# Patient Record
Sex: MALE | Race: WHITE | NOT HISPANIC OR LATINO | Employment: OTHER | URBAN - METROPOLITAN AREA
[De-identification: names, ages, dates, MRNs, and addresses within clinical notes are randomized per-mention and may not be internally consistent; named-entity substitution may affect disease eponyms.]

---

## 2021-01-13 NOTE — PROGRESS NOTES
Assessment & Plan     Left foot pain  55-year-old male, left plantar foot pain, mid ball of the foot, likely foreign body.  X-ray did not show metallic body.  He will return later today for procedure to see if we can locate.  - XR Foot Left G/E 3 Views    Chronic maxillary sinusitis  Recurrent maxillary sinusitis.  He had teeth removed in the past, he has drainage from those areas when he has sinus symptoms.  Obtaining CT scan, to further evaluate, given Augmentin if recurrence of symptoms.  - CT Sinus w/o Contrast; Future  - amoxicillin-clavulanate (AUGMENTIN) 875-125 MG tablet; Take 1 tablet by mouth 2 times daily      25 minutes spent on the date of the encounter doing chart review, interpretation of tests, patient visit and documentation          Tobacco Cessation:   reports that he has been smoking cigarettes. He has a 30.00 pack-year smoking history. He has never used smokeless tobacco.            Return in about 4 weeks (around 2/12/2021) for Annual Well Check.    Ron Terrazas MD  [facility] XOCHITL Hi is a 55 year old who presents to clinic today for the following health issues     HPI       Concern - Foot Pain  Onset: Puncture wound in April 2020. Not sure what he stepped on  Description: it is causing so much pain he cant walk on it at all. He has to have shoes on. But by the end of the day he can barely walk on it. Has its own heart beat.  Intensity: moderate  Progression of Symptoms:  Worsening- been worsening since Sept.  Accompanying Signs & Symptoms: no drainage. Redness but no puss coming out.   Previous history of similar problem: no  Precipitating factors:        Worsened by: walking  Alleviating factors:        Improved by: no  Therapies tried and outcome: he has tried to cut things out of it.       Also states that he has an off and on sinus infection for a while. No fever. But he has mouth surgery about 4 years ago. They removed on of his upper left molars.  States that the roots went up into the sinus, and since then he things food and things get up there and cause infections.             Review of Systems   Constitutional, HEENT, cardiovascular, pulmonary, gi and gu systems are negative, except as otherwise noted.      Objective    /72   Pulse 97   Temp 98  F (36.7  C) (Temporal)   Resp 14   Wt 87.1 kg (192 lb)   SpO2 99%   BMI 27.74 kg/m    Body mass index is 27.74 kg/m .  Physical Exam   GENERAL: healthy, alert and no distress  EYES: Eyes grossly normal to inspection, PERRL and conjunctivae and sclerae normal  HENT: ear canals and TM's normal, nose and mouth without ulcers or lesions  NECK: no adenopathy, no asymmetry, masses, or scars and thyroid normal to palpation  RESP: lungs clear to auscultation - no rales, rhonchi or wheezes  CV: regular rate and rhythm, normal S1 S2, no S3 or S4, no murmur, click or rub, no peripheral edema and peripheral pulses strong  MS: normal muscle tone, normal range of motion, no cyanosis, clubbing, or edema and callus formation left ball of foot, tender to palpation  NEURO: Normal strength and tone, mentation intact and speech normal  PSYCH: mentation appears normal, affect normal/bright    Xray - Reviewed and interpreted by me.  No obvious foreign body

## 2021-01-15 ENCOUNTER — ANCILLARY PROCEDURE (OUTPATIENT)
Dept: GENERAL RADIOLOGY | Facility: CLINIC | Age: 56
End: 2021-01-15
Attending: FAMILY MEDICINE
Payer: COMMERCIAL

## 2021-01-15 ENCOUNTER — OFFICE VISIT (OUTPATIENT)
Dept: FAMILY MEDICINE | Facility: CLINIC | Age: 56
End: 2021-01-15
Payer: COMMERCIAL

## 2021-01-15 VITALS
WEIGHT: 192 LBS | RESPIRATION RATE: 14 BRPM | OXYGEN SATURATION: 99 % | HEART RATE: 97 BPM | BODY MASS INDEX: 27.74 KG/M2 | TEMPERATURE: 98 F | SYSTOLIC BLOOD PRESSURE: 126 MMHG | DIASTOLIC BLOOD PRESSURE: 72 MMHG

## 2021-01-15 DIAGNOSIS — Z87.821 HISTORY OF RETAINED FOREIGN BODY FULLY REMOVED: Primary | ICD-10-CM

## 2021-01-15 DIAGNOSIS — J32.0 CHRONIC MAXILLARY SINUSITIS: ICD-10-CM

## 2021-01-15 DIAGNOSIS — M79.672 LEFT FOOT PAIN: Primary | ICD-10-CM

## 2021-01-15 PROCEDURE — 99203 OFFICE O/P NEW LOW 30 MIN: CPT | Mod: 25 | Performed by: FAMILY MEDICINE

## 2021-01-15 PROCEDURE — 28190 REMOVAL OF FOOT FOREIGN BODY: CPT | Performed by: FAMILY MEDICINE

## 2021-01-15 PROCEDURE — 99207 PR DROP WITH A PROCEDURE: CPT | Performed by: FAMILY MEDICINE

## 2021-01-15 PROCEDURE — 73630 X-RAY EXAM OF FOOT: CPT | Mod: LT | Performed by: RADIOLOGY

## 2021-01-15 NOTE — PROGRESS NOTES
Assessment & Plan     History of retained foreign body fully removed  Follow-up from last visit, patient had left plantar ball of foot, would like small foreign body removed.  See procedure note below.  Tolerated procedure well was discharged in stable condition with wound care follow-up instructions.  Follow-up if no improvement or any worsening.  - REMOVAL FOREIGN BODY FOOT, SUBCUTANEOUS         Tobacco Cessation:   reports that he has been smoking cigarettes. He has a 30.00 pack-year smoking history. He has never used smokeless tobacco.        Return in about 4 weeks (around 2/12/2021) for Annual Well Check.    Ron Terrazas MD  [facility] XOCHITL Hi is a 55 year old who presents to clinic today for the following health issues     HPI    patient is here for a removal of a forigen body from the bottom of his left foot.        Review of Systems   Constitutional, HEENT, cardiovascular, pulmonary, gi and gu systems are negative, except as otherwise noted.      Objective    There were no vitals taken for this visit.  There is no height or weight on file to calculate BMI.  Physical Exam   GENERAL: healthy, alert and no distress  MS: no gross musculoskeletal defects noted, no edema  SKIN: Left plantar ball of foot, small 8 mm area of callus, dark brown material in center    Procedure note-foreign body removal  Explained procedure to patient, written consent was obtained.  Area was cleansed with alcohol and injected with 0.2 cc of lidocaine 1% without epinephrine.  Alligator clip was used to grasp the end of the foreign material and remove, appeared plant-like.  An 18-gauge needle was used to explore the remaining area, dressed with bacitracin and a Band-Aid.  Discharged in stable condition with wound care and follow-up instructions.

## 2021-01-25 ENCOUNTER — TELEPHONE (OUTPATIENT)
Dept: FAMILY MEDICINE | Facility: CLINIC | Age: 56
End: 2021-01-25

## 2021-01-25 ENCOUNTER — ANCILLARY PROCEDURE (OUTPATIENT)
Dept: CT IMAGING | Facility: CLINIC | Age: 56
End: 2021-01-25
Attending: FAMILY MEDICINE
Payer: COMMERCIAL

## 2021-01-25 DIAGNOSIS — J32.0 CHRONIC MAXILLARY SINUSITIS: ICD-10-CM

## 2021-01-25 PROCEDURE — 70486 CT MAXILLOFACIAL W/O DYE: CPT | Performed by: RADIOLOGY

## 2021-01-25 NOTE — RESULT ENCOUNTER NOTE
Please advise patient that his CT scan did not show chronic sinusitis.  Follow-up if continued symptoms, at that time would recommend ENT referral.    Ron Terrazas MD, FAAFP  Family Medicine Physician  [facility] [place]  [address], [place] [address]

## 2021-01-26 NOTE — TELEPHONE ENCOUNTER
----- Message from Ron Terrazas MD sent at 1/25/2021  3:59 PM CST -----  Please advise patient that his CT scan did not show chronic sinusitis.  Follow-up if continued symptoms, at that time would recommend ENT referral.    Ron Terrazas MD, FAAFP  Family Medicine Physician  [facility] [place]  [address] [place] [address]

## 2021-04-24 ENCOUNTER — HEALTH MAINTENANCE LETTER (OUTPATIENT)
Age: 56
End: 2021-04-24

## 2021-05-25 ENCOUNTER — TELEPHONE (OUTPATIENT)
Dept: FAMILY MEDICINE | Facility: CLINIC | Age: 56
End: 2021-05-25

## 2021-05-25 ENCOUNTER — MYC MEDICAL ADVICE (OUTPATIENT)
Dept: FAMILY MEDICINE | Facility: CLINIC | Age: 56
End: 2021-05-25

## 2021-05-25 NOTE — TELEPHONE ENCOUNTER
Pt is wondering if there are any other suggestions for his sciatica pain, chiropractor is not helping.

## 2021-05-27 ENCOUNTER — ANCILLARY PROCEDURE (OUTPATIENT)
Dept: GENERAL RADIOLOGY | Facility: CLINIC | Age: 56
End: 2021-05-27
Attending: FAMILY MEDICINE
Payer: COMMERCIAL

## 2021-05-27 ENCOUNTER — OFFICE VISIT (OUTPATIENT)
Dept: FAMILY MEDICINE | Facility: CLINIC | Age: 56
End: 2021-05-27
Payer: COMMERCIAL

## 2021-05-27 VITALS
RESPIRATION RATE: 14 BRPM | OXYGEN SATURATION: 99 % | TEMPERATURE: 98.6 F | BODY MASS INDEX: 26.77 KG/M2 | HEART RATE: 109 BPM | SYSTOLIC BLOOD PRESSURE: 132 MMHG | WEIGHT: 187 LBS | DIASTOLIC BLOOD PRESSURE: 74 MMHG | HEIGHT: 70 IN

## 2021-05-27 DIAGNOSIS — M54.41 ACUTE RIGHT-SIDED LOW BACK PAIN WITH RIGHT-SIDED SCIATICA: Primary | ICD-10-CM

## 2021-05-27 PROCEDURE — 99214 OFFICE O/P EST MOD 30 MIN: CPT | Performed by: FAMILY MEDICINE

## 2021-05-27 PROCEDURE — 72100 X-RAY EXAM L-S SPINE 2/3 VWS: CPT | Performed by: RADIOLOGY

## 2021-05-27 RX ORDER — GABAPENTIN 100 MG/1
100 CAPSULE ORAL 2 TIMES DAILY
Qty: 60 CAPSULE | Refills: 0 | Status: ON HOLD | OUTPATIENT
Start: 2021-05-27 | End: 2021-08-11

## 2021-05-27 RX ORDER — CYCLOBENZAPRINE HCL 10 MG
10 TABLET ORAL 3 TIMES DAILY PRN
Qty: 21 TABLET | Refills: 0 | Status: SHIPPED | OUTPATIENT
Start: 2021-05-27 | End: 2021-08-12

## 2021-05-27 ASSESSMENT — MIFFLIN-ST. JEOR: SCORE: 1675.73

## 2021-05-27 NOTE — PATIENT INSTRUCTIONS
Patient Education     Back Care Tips     Caring for your back  These are things you can do to prevent a recurrence of acute back pain and to reduce symptoms from chronic back pain:    Stay at a healthy weight. If you are overweight, losing weight will help most types of back pain.    Exercise is an important part of recovery from most types of back pain. The muscles behind and in front of the spine support the back. This means strengthening both the back muscles and the abdominal muscles will provide better support for your spine.     Swimming and brisk walking are good overall exercises to improve your fitness level.    Practice safe lifting methods (see below).    Practice good posture when sitting, standing, and walking. Don't sit for a long time. This puts more stress on the lower back than standing or walking.    Wear quality shoes with good arch support. Foot and ankle alignment can affect back symptoms. Don't wear high heels.    Therapeutic massage can help relax the back muscles without stretching them.    During the first 24 to 72 hours after an acute injury or flare-up of chronic back pain, put an ice pack on the painful area for 20 minutes and then remove it for 20 minutes. Do thisover a period of 60 to 90 minutes, or several times a day. As a safety precaution, don't use a heating pad at bedtime. Sleeping on a heating pad can lead to skin burns or tissue damage.    You can alternate using ice and heat.  Medicines  Talk with your healthcare provider before using medicines, especially if you have other health problems or are taking other medicines.    You may use over-the-counter medicines, such as acetaminophen, ibuprofen, or naprosyn to control pain, unless your healthcare provider prescribed other pain medicine. Talk with your healthcare provider before taking any medicines if you have a chronic condition such as diabetes, liver or kidney disease, stomach ulcers, or digestive bleeding, or are taking  blood thinners.    Be careful if you are given prescription pain medicines, opioids, or medicine for muscle spasm. They can cause drowsiness, and affect your coordination, reflexes, and judgment. Don't drive or operate heavy machinery while taking these types of medicines. Take prescription pain medicine only as prescribed by your healthcare provider.  Lumbar stretch  This simple stretch will help relax muscle spasm and keep your back more limber. If exercise makes your back pain worse, don t do it.    Lie on your back with your knees bent and both feet on the ground.    Slowly raise your left knee to your chest as you flatten your lower back against the floor. Hold for 5 seconds.    Relax and repeat the exercise with your right knee.    Do 10 of these exercises for each leg.  Safe lifting method    Don t bend over at the waist to lift an object off the floor.  Instead, bend your knees and hips in a squat.     Keep your back and head upright    Hold the object close to your body, directly in front of you.    Straighten your legs to lift the object.     Lower the object to the floor in the reverse fashion.    If you must slide something across the floor, push it.    Posture tips  Sitting  Sit in chairs with straight backs or low-back support. Keep your knees lower than your hips, with your feet flat on the floor.  When driving, sit up straight. Adjust the seat forward so you are not leaning toward the steering wheel.  A small pillow or rolled towel behind your lower back may help if you are driving long distances.   Standing  When standing for long periods, shift most of your weight to one leg at a time. Switch legs every few minutes.   Sleeping  The best way to sleep is on your side with your knees bent. Put a low pillow under your head to support your neck in a neutral spine position. Don't use thick pillows that bend your neck to one side. Put a pillow between your legs to further relax your lower back. If you  sleep on your back, put pillows under your knees to support your legs in a slightly flexed position. Use a firm mattress. If your mattress sags, replace it, or use a 1/2-inch plywood board under the mattress to add support.  Follow-up care  Follow up with your healthcare provider, or as advised.  If X-rays, a CT scan or an MRI scan were taken, they may be reviewed by a radiologist. You will be told of any new findings that may affect your care.  Call 911  Call 911 if any of the following occur:    Trouble breathing    Confusion    Very drowsy    Fainting or loss of consciousness    Rapid or very slow heart rate    Loss of  bowel or bladder control  When to seek medical advice  Call your healthcare provider right away if any of the following occur:    Pain becomes worse or spreads to your arms or legs    Weakness or numbness in one or both arms or legs    Numbness in the groin area  [employer] last reviewed this educational content on 11/1/2019 2000-2021 [employer]. All rights reserved. This information is not intended as a substitute for professional medical care. Always follow your healthcare professional's instructions.           Patient Education     Relieving Back Pain  Back pain is a common problem. You can strain back muscles by lifting too much weight or just by moving the wrong way. Back strain can be uncomfortable, even painful. And it can take weeks or months to improve. To help yourself feel better and prevent future back strains, try these tips.  Important: Don't give aspirin to children or teens without first discussing it with your child's healthcare provider.  Ice    Ice reduces muscle pain and swelling. It helps most during the first 24 to 48 hours after an injury.    Wrap an ice pack or a bag of frozen peas in a thin towel. Never put ice directly on your skin.    Place the ice where your back hurts the most.    Don t ice for more than 20 minutes at a time.    You can use ice several  times a day.  Medicines  Over-the-counter pain relievers include acetaminophen and anti-inflammatory medicines, which includes aspirin, naproxen, or ibuprofen. They can help ease discomfort. Some also reduce swelling.    Tell your healthcare provider about any medicines you are already taking.    Take medicines only as directed.  Manipulation and massage  Having manipulation by an osteopathic doctor or chiropractor may be helpful. Getting a massage also may help.   Heat  After the first 48 hours, heat can relax sore muscles and improve blood flow.    Try a warm bath or shower. Or use a heating pad set on low. To prevent a burn, keep a cloth between you and the heating pad.    Don t use a heating pad for more than 15 minutes at a time. Never sleep on a heating pad.  [employer] last reviewed this educational content on 6/1/2018 2000-2021 [employer]. All rights reserved. This information is not intended as a substitute for professional medical care. Always follow your healthcare professional's instructions.

## 2021-05-27 NOTE — PROGRESS NOTES
"    Assessment & Plan     Acute right-sided low back pain with right-sided sciatica  55-year-old male with a acute right-sided low back pain with right-sided sciatic symptoms.  He has had some improvement with chiropractor and massage.  After recent yardwork has had some worsening.  On exam today he does have mild positive straight leg raise, x-ray shows degenerative changes L4 L5-S1, he declined physical therapy.  We discussed MRI and neurosurgery evaluation, he would like to try cyclobenzaprine and gabapentin first.  If no improvement he will follow-up and we will consider MRI neurosurgery evaluation for corticosteroid injection.  - XR Lumbar Spine 2/3 Views  - cyclobenzaprine (FLEXERIL) 10 MG tablet; Take 1 tablet (10 mg) by mouth 3 times daily as needed for muscle spasms  - gabapentin (NEURONTIN) 100 MG capsule; Take 1 capsule (100 mg) by mouth 2 times daily       Tobacco Cessation:   reports that he has been smoking cigarettes. He has a 30.00 pack-year smoking history. He has never used smokeless tobacco.  Tobacco Cessation Action Plan: Information offered: Patient not interested at this time    BMI:   Estimated body mass index is 27.01 kg/m  as calculated from the following:    Height as of this encounter: 1.772 m (5' 9.76\").    Weight as of this encounter: 84.8 kg (187 lb).         Return in about 4 weeks (around 6/24/2021) for Annual Well Check.    Ron Terrazas MD  [facility] X[place] Hi is a 55 year old who presents for the following health issues     HPI     Your back pain is chronic   Fv Hpi Chronic Recurring Back Pain    Question 5/27/2021  8:15 AM CDT - Filed by Patient   Where is your back pain located?  right buttock   How would you describe your back pain?  burning    cramping    gnawing    sharp    shooting    stabbing   Where does your back pain spread?  right thigh    right knee    right foot   Since you noticed your back pain, how has it changed?  always present, " "but gets better and worse   Does your back pain interfere with your job? Yes   Since your last visit, have you tried any new treatment? Yes   If yes, which: Chiropractor    cold    heat    massage    NSAIDS (Ibuprofen, Naproxen)    TENS unit       Patient states that he has been seeing the chiropractor for years for other things. But its been consistent for the last 3 weeks. Also has had deep muscle therapy 2 times this week. This helped relax things, but not at all.     States that he feels like he has any quality of life right now. Making it hard to work or do anything that he really wants to do.     Taking the Aleve, but taking more than he should be.        Review of Systems   Constitutional, HEENT, cardiovascular, pulmonary, gi and gu systems are negative, except as otherwise noted.      Objective    /74   Pulse 109   Temp 98.6  F (37  C) (Temporal)   Resp 14   Ht 1.772 m (5' 9.76\")   Wt 84.8 kg (187 lb)   SpO2 99%   BMI 27.01 kg/m    Body mass index is 27.01 kg/m .  Physical Exam   GENERAL: healthy, alert and no distress  RESP: lungs clear to auscultation - no rales, rhonchi or wheezes  CV: regular rate and rhythm, normal S1 S2, no S3 or S4, no murmur, click or rub, no peripheral edema and peripheral pulses strong  MS: no gross musculoskeletal defects noted, no edema  NEURO: Normal strength and tone, mentation intact and speech normal  Comprehensive back pain exam:  No tenderness, Range of motion not limited by pain, Lower extremity strength functional and equal on both sides, Lower extremity reflexes within normal limits bilaterally, Lower extremity sensation normal and equal on both sides and Straight leg positive on  right, indicating possible ipsilateral radiculopathy  PSYCH: mentation appears normal, affect normal/bright    Xray - Reviewed and interpreted by me.  Lumbar, degenerative changes L4, L5-S1            "

## 2021-06-16 ENCOUNTER — TELEPHONE (OUTPATIENT)
Dept: FAMILY MEDICINE | Facility: CLINIC | Age: 56
End: 2021-06-16

## 2021-06-16 DIAGNOSIS — M54.41 ACUTE RIGHT-SIDED LOW BACK PAIN WITH RIGHT-SIDED SCIATICA: Primary | ICD-10-CM

## 2021-06-16 NOTE — TELEPHONE ENCOUNTER
Reason for Call: Request for an order or referral:    Order or referral being requested: MRI and Neurosurgery consult    Date needed: by next week    Has the patient been seen by the PCP for this problem? YES    Additional comments: 5/27/21 note states -  If no improvement he will follow-up and we will consider MRI neurosurgery evaluation for corticosteroid injection. Patient states no improvement.    Phone number Patient can be reached at:  Cell number on file:    Telephone Information:   Mobile [phone]       Best Time:  ASAP    Can we leave a detailed message on this number?      Call taken on 6/16/2021 at 8:05 AM by Samreen Mays

## 2021-06-16 NOTE — TELEPHONE ENCOUNTER
Order placed for MRI and referral placed as well.     Please assist with scheduling MRI as needed.     Patient will receive a call to schedule.

## 2021-06-16 NOTE — TELEPHONE ENCOUNTER
Provider please review and advise-do you want to order or do follow up visit (virtual or inperson?) and then order?    Romelia Larson CMA ([facility])

## 2021-06-21 ENCOUNTER — NURSE TRIAGE (OUTPATIENT)
Dept: FAMILY MEDICINE | Facility: CLINIC | Age: 56
End: 2021-06-21

## 2021-06-21 DIAGNOSIS — M54.41 ACUTE RIGHT-SIDED LOW BACK PAIN WITH RIGHT-SIDED SCIATICA: Primary | ICD-10-CM

## 2021-06-21 RX ORDER — GABAPENTIN 100 MG/1
200 CAPSULE ORAL 3 TIMES DAILY
Qty: 90 CAPSULE | Refills: 1 | Status: ON HOLD | OUTPATIENT
Start: 2021-06-21 | End: 2021-08-11

## 2021-06-21 NOTE — TELEPHONE ENCOUNTER
Patient was informed. Team please reach out to the patient to schedule an orthopedic appointment.     Juan Gold RN, BSN  [facility] [facility] [facility]  June 21, 2021

## 2021-06-21 NOTE — TELEPHONE ENCOUNTER
"Pain started couple months ago. As of last week pain became unbearable. Was seen 5/27/21 with SA who recommended MRI and steroid injections if no improvement. Patient has appt for MRI on Friday 6/25 but wants to skip the corticosteroid injections because he states he has talked with a few people who have these done and they are doing one every month for few months in a row and patient states \"I cannot wait that long, I need something done now and need this expedited\" Patient states he has no quality of life and can't go on like this. States pain is unbearable. Pain when standing is \"18/10\" and when lying with rotating heat and ice can get it down to 3/10. Is unable to walk \"AT ALL\" and unable to to anything. States the medications that were prescribed (Flexeril, Gabapentin) do not do anything and don't help at all. States he has done chiropractor and muscle therapy and these are not helping.     Patient would like some expedited help to get him some relief because once again he has no \"quality of life\" and cannot go on like this. As mentioned above MRI is on 6/25.     Routing to provider for review-    1. ONSET: \"When did the pain start?\"       See above  2. LOCATION: \"Where is the pain located?\"       Right leg. Entire leg  3. PAIN: \"How bad is the pain?\"    (Scale 1-10; or mild, moderate, severe)    -  MILD (1-3): doesn't interfere with normal activities     -  MODERATE (4-7): interferes with normal activities (e.g., work or school) or awakens from sleep, limping     -  SEVERE (8-10): excruciating pain, unable to do any normal activities, unable to walk      Standing \"I would rate it as 18\" Laying down and not moving while icing can get down to 3/10. Pills he received did nothing  4. WORK OR EXERCISE: \"Has there been any recent work or exercise that involved this part of the body?\"       No  5. CAUSE: \"What do you think is causing the leg pain?\"      Life long issue  6. OTHER SYMPTOMS: \"Do you have any other " "symptoms?\" (e.g., chest pain, back pain, breathing difficulty, swelling, rash, fever, numbness, weakness)      Mild back pain. Thinks the pain is radiating from back and a nerve is affected  7. PREGNANCY: \"Is there any chance you are pregnant?\" \"When was your last menstrual period?\"      NA    GO TO ED NOW: You need to be seen in the Emergency Department. Go to the ER Hospital. Leave now. Drive carefully.    States he was seen for this and wants to see what his PCP recommends.    Leida Rob RN  [place]        Reason for Disposition    Unable to walk    Additional Information    Negative: Looks like a broken bone or dislocated joint (e.g., crooked or deformed)    Negative: Sounds like a life-threatening emergency to the triager    Negative: Followed a hip injury    Negative: Followed a knee injury    Negative: Followed an ankle or foot injury    Negative: Back pain radiating (shooting) into leg(s)    Negative: Foot pain is the main symptom    Negative: Ankle pain is the main symptom    Negative: Knee pain is the main symptom    Negative: Leg swelling is the main symptom    Negative: Chest pain    Negative: Difficulty breathing    Negative: Entire foot is cool or blue in comparison to other side    Protocols used: LEG PAIN-A-OH      "

## 2021-06-21 NOTE — TELEPHONE ENCOUNTER
Please advise patient to increase gabapentin to 2 capsules 3 times a day, help him schedule orthopedic  appointment, see previous note.  They had reached out to him to schedule that appointment.  Go to the emergency room if any worsening to include increasing pain and weakness, schedule follow-up appointment with me this week if no improvement.    Ron Terrazas MD, FAAFP  Family Medicine Physician  [facility] [place]  [address], [place] [address]

## 2021-06-25 ENCOUNTER — MYC MEDICAL ADVICE (OUTPATIENT)
Dept: FAMILY MEDICINE | Facility: CLINIC | Age: 56
End: 2021-06-25

## 2021-06-25 ENCOUNTER — ANCILLARY PROCEDURE (OUTPATIENT)
Dept: MRI IMAGING | Facility: CLINIC | Age: 56
End: 2021-06-25
Attending: FAMILY MEDICINE
Payer: COMMERCIAL

## 2021-06-25 DIAGNOSIS — M54.41 ACUTE RIGHT-SIDED LOW BACK PAIN WITH RIGHT-SIDED SCIATICA: ICD-10-CM

## 2021-06-25 PROCEDURE — 72148 MRI LUMBAR SPINE W/O DYE: CPT | Mod: GC | Performed by: RADIOLOGY

## 2021-06-25 NOTE — TELEPHONE ENCOUNTER
Orthopedic referral previously placed.  Please assist patient with scheduling this.    Ron Terrazas MD, FAAFP  Family Medicine Physician  [facility] [place]  [address] [place] [address]

## 2021-06-25 NOTE — RESULT ENCOUNTER NOTE
K[place],  Your recent MRI does show possible nerve impingement that could be causing your symptoms.  Please continue with plan to see orthopedic surgery.  Please let me know if you have any questions or concerns and follow up as discussed in clinic.    Sincerely.  Dr. JEFF Terrazas MD, FAAFP  Family Medicine Physician  [facility] [place]  [address] [place] [address]

## 2021-06-25 NOTE — TELEPHONE ENCOUNTER
Patient calling to check status of message just to make sure it went through ok, he is wanting a response back asap as he cant take the pain anymore and he is missing work due to this.  Please call with results and care plan.

## 2021-07-02 ENCOUNTER — OFFICE VISIT (OUTPATIENT)
Dept: NEUROSURGERY | Facility: CLINIC | Age: 56
End: 2021-07-02
Attending: FAMILY MEDICINE
Payer: COMMERCIAL

## 2021-07-02 VITALS — HEART RATE: 102 BPM | RESPIRATION RATE: 20 BRPM | SYSTOLIC BLOOD PRESSURE: 153 MMHG | DIASTOLIC BLOOD PRESSURE: 80 MMHG

## 2021-07-02 DIAGNOSIS — M54.41 ACUTE RIGHT-SIDED LOW BACK PAIN WITH RIGHT-SIDED SCIATICA: ICD-10-CM

## 2021-07-02 PROCEDURE — 99204 OFFICE O/P NEW MOD 45 MIN: CPT | Performed by: PHYSICIAN ASSISTANT

## 2021-07-02 NOTE — PATIENT INSTRUCTIONS
Patient Instructions    Surgery scheduled at [facility] for Right lumbar 5- sacral 1 hemilaminotomy and microdiscectomy  *lumbarized sacrum* with Dr. Landeros  Pre-Operative    Surgical risks: blood clots in the leg or lung, problems urinating, nerve damage, drainage from the incision, infection,stiffness    Pre-operative physical with primary care physician within 30 days of surgical date.     Likely same day procedure with discharge home day of surgery, may stay for 23 hour observation hospitalization for monitoring.       Shower procedure  o Please shower with antimicrobial soap the night before surgery and morning of surgery. Please refer to showering instruction sheet in folder.    Eating/Drinking  o Stop all solid foods 8 hours before surgery.  o Keep drinking clear liquids until 4 hours before surgery  - Clear liquids include water, clear juice, black coffee, or clear tea without milk, Gatorade, clear soda.     Medications  o Discontinue Aspirin, NSAIDs (Advil/Ibuprofen, Indocin, Naproxen,Nuprin,Relafen/Nabumetone, Diclofenac,Meloxicam, Aleve, Celebrex) x 7 days prior to surgical date  o You can take Tylenol (Acetaminophen) for pain, 1000 mg  - Do not exceed 3,000 mg per day   o Any other medications prescribed, please discuss with your primary care provider at your pre-operative physical     As part of preparation for your upcoming procedure you are required to have a test for the novel Coronavirus, COVID-19  o Please call the drive-up testing number to schedule your appointment. The test needs to be completed within 4 days (96) hours of surgery.   o Scheduling Number: [phone]     Pain Management    Dealing with pain  o As your body heals, you might feel a stabbing, burning, or aching pain. You may also have some numbness.  o Everyone feels pain differently, we may ask you to rate your pain using a pain scale. This will let us know how much pain you feel.   o Keep in mind that  medicine won't take away all of your pain. It helps to try other ways to relax and ease pain.     Things to help with pain  o After surgery, we will give you medicine for your pain. These medications work well, but they can make you drowsy, itchy, or sick to your stomach. If we give you narcotics for pain, try to take the pills with food.   o For mild to moderate pain, you can take medication such as Tylenol. These can be used with narcotics, but make sure that your narcotic does not contain Tylenol.   - Do NOT drive while taking narcotic pain medication  - Do NOT drink alcohol while using any pain medication  o You can utilize ice as needed (no longer than 20 minutes at one time)    You may resume taking NSAIDs (ex. Ibuprofen, aleve, naproxen) 14 days post op     Incision Care    No submerging incision in water such as pools, hot tubs, baths for at least 8 weeks or until incision is healed    It is okay to shower, just pat the incision dry     Remove dressing as instructed upon discharge    Watch for signs of infection  o Redness, swelling, warmth, drainage, and fever of 101 degrees or higher  o Notify clinic [phone].  Activity Restrictions    For the first 6-8 weeks, no lifting > 10 pounds, limited bending, twisting, or overhead reaching.    Take stairs in moderation     Ok to walk as tolerated, take short frequent walks. You may gradually increase the distance as tolerated.     Avoid bed rest and prolonged sitting for longer than 30 minutes (change positions frequently while awake)    No contact sports until after follow up visit    No high impact activities such as; running/jogging, snowmobile or 4 watts riding or any other recreational vehicles.  Post-Op Follow Up Appointments    2 week incision check with RN    6 week post op follow up visit with Physician Assistant or Nurse Practitioner     Please call to schedule follow up appointment at [phone]  Resources    If you are currently employed, you  will need to be off work for 2-4 weeks for post op recovery and healing.  Please fax any FMLA/short term disability paperwork to [phone]. You may call our clinic when you'd like to return to work and we can provide a work letter.     Video: [email or web address]/[email or web address]     (video for lumbar stenosis/decompression )      1.) Someone will be in touch to schedule your steroid injection. Please call our office [phone] if you do not hear from someone by mid-next week.

## 2021-07-02 NOTE — NURSING NOTE
Reviewed pre- and post-operative instructions as outlined in the After Visit Summary/Patient Instructions with patient.   Surgery folder was given to patient    Patient Education Topic: Pre-operative teaching     Learner(s): Patient and Significant other/Spouse     Knowledge Level: Advanced     Readiness to Learn: Ready    Method:  Verbal explanation and Written material     Outcome: Able to verbalize instructions    Barriers to Learning: No barrier    Covid Testing: patient educated they will need to have a test for the novel Coronavirus, COVID-19.The test needs to be completed within 4 days (96) hours of surgery. Order Placed. Pt will call to schedule when procedure is scheduled.     Scheduling Number: [phone]    Patient had the opportunity for questions to be answered. Advised Patient and Significant other/Spouse  to call clinic with any questions/concerns. Gave patient antibacterial soap for pre-surgery skin preparation.       Epidural steroid injection ordered and pt prefers to have this done through [facility] as insurance may not cover through [facility]. Appt request sent to procedure schedulers to schedule.        Mable Collins RNCC  Neurology

## 2021-07-02 NOTE — PROGRESS NOTES
Neurosurgery Consult    HPI    Mr. Kang is a 55-year-old male presents to clinic today for evaluation of right leg radiculopathy.  Symptoms have been present for about 4 months.  And have worsened recently.  He describes pain numbness and weakness roughly in a L5/S1 distribution in his right leg.  Initially he did have some pain in his left leg but now is mostly focused on the right.  It is exacerbated by bending twisting walking coughing sneezing and with bowel movements.  He has tried physical therapy and chiropractic without improvement he has not tried an epidural steroid injection.  He underwent lumbar MRI which showed disc herniation on the right at L5 - S1.    Medical history  Tobacco use      Social history  Has his own construction company, also does a lot of heavy machinery installation      B/P: 153/80, T: Data Unavailable, P: 102, R: 20       Exam    Alert oriented no acute distress  Bilateral lower extremities with 5 out of 5 strength with the exception of 5- out of 5 strength with ankle dorsiflexion plantarflexion of the right foot.  Reflexes 1+ patella and ankle on the right, absent on the left.  Positive straight leg raise on the right, and positive cross straight leg raise on the left  Lumbar spine nontender to palpation  Gait is antalgic on the left  Patient has difficulty with plantarflexion while standing on the right as well as with dorsiflexion while standing on the right    Imaging    Lumbar MRI demonstrates a lumbarized S1 and a right subarticular disc extrusion at L5-S1 which abuts and impinges on the descending right S1 nerve in the right lateral recess.    Assessment    Right leg radiculopathy     Plan:      We would recommend that the patient obtain a epidural steroid injection on the right at L5-S1 transforaminal he.  Also we will tentatively plan for a right lumbar 5-sacral 1 hemilaminectomy and microdiscectomy; (lumbarized sacrum).  We discussed the risks and benefits of surgery  which include infection, bleeding, spinal fluid leak, damage to the peripheral nerves, failure to improve from surgery.  Patient understands risks would like to proceed.  If his injection is helpful in decreasing his symptoms are resolving his symptoms revolve either cancel or delay surgery.  If his injection is not helpful then we will not have any further delay in proceeding with surgical intervention.    Dr. Landeros met with the patient today reviewed the above and is in agreement with the plan.  The patient watch her educational video regarding lumbar disc herniation.    Total time of 60 minutes spent with the patient today in counseling and coordination of care.

## 2021-07-02 NOTE — LETTER
7/2/2021         RE: Kolton Kang  [address] [address] [address]        Dear Colleague,    Thank you for referring your patient, Kolton Kang, to the [facility] [place]. Please see a copy of my visit note below.    Neurosurgery Consult    HPI    Mr. Kang is a 55-year-old male presents to clinic today for evaluation of right leg radiculopathy.  Symptoms have been present for about 4 months.  And have worsened recently.  He describes pain numbness and weakness roughly in a L5/S1 distribution in his right leg.  Initially he did have some pain in his left leg but now is mostly focused on the right.  It is exacerbated by bending twisting walking coughing sneezing and with bowel movements.  He has tried physical therapy and chiropractic without improvement he has not tried an epidural steroid injection.  He underwent lumbar MRI which showed disc herniation on the right at L5 - S1.    Medical history  Tobacco use      Social history  Has his own construction company, also does a lot of heavy machinery installation      B/P: 153/80, T: Data Unavailable, P: 102, R: 20       Exam    Alert oriented no acute distress  Bilateral lower extremities with 5 out of 5 strength with the exception of 5- out of 5 strength with ankle dorsiflexion plantarflexion of the right foot.  Reflexes 1+ patella and ankle on the right, absent on the left.  Positive straight leg raise on the right, and positive cross straight leg raise on the left  Lumbar spine nontender to palpation  Gait is antalgic on the left  Patient has difficulty with plantarflexion while standing on the right as well as with dorsiflexion while standing on the right    Imaging    Lumbar MRI demonstrates a lumbarized S1 and a right subarticular disc extrusion at L5-S1 which abuts and impinges on the descending right S1 nerve in the right lateral recess.    Assessment    Right leg radiculopathy     Plan:      We would  recommend that the patient obtain a epidural steroid injection on the right at L5-S1 transforaminal he.  Also we will tentatively plan for a right lumbar 5-sacral 1 hemilaminectomy and microdiscectomy; (lumbarized sacrum).  We discussed the risks and benefits of surgery which include infection, bleeding, spinal fluid leak, damage to the peripheral nerves, failure to improve from surgery.  Patient understands risks would like to proceed.  If his injection is helpful in decreasing his symptoms are resolving his symptoms revolve either cancel or delay surgery.  If his injection is not helpful then we will not have any further delay in proceeding with surgical intervention.    Dr. Landeros met with the patient today reviewed the above and is in agreement with the plan.  The patient watch her educational video regarding lumbar disc herniation.    Total time of 60 minutes spent with the patient today in counseling and coordination of care.      Again, thank you for allowing me to participate in the care of your patient.        Sincerely,        Sabino Ruiz PA-C

## 2021-07-05 RX ORDER — CEFAZOLIN SODIUM 2 G/100ML
2 INJECTION, SOLUTION INTRAVENOUS SEE ADMIN INSTRUCTIONS
Status: CANCELLED | OUTPATIENT
Start: 2021-07-05

## 2021-07-05 RX ORDER — CEFAZOLIN SODIUM 2 G/100ML
2 INJECTION, SOLUTION INTRAVENOUS
Status: CANCELLED | OUTPATIENT
Start: 2021-07-05

## 2021-07-06 DIAGNOSIS — Z11.59 ENCOUNTER FOR SCREENING FOR OTHER VIRAL DISEASES: ICD-10-CM

## 2021-07-09 DIAGNOSIS — Z11.59 ENCOUNTER FOR SCREENING FOR OTHER VIRAL DISEASES: ICD-10-CM

## 2021-07-09 LAB
SARS-COV-2 RNA RESP QL NAA+PROBE: NORMAL
SPECIMEN SOURCE: NORMAL

## 2021-07-09 PROCEDURE — U0003 INFECTIOUS AGENT DETECTION BY NUCLEIC ACID (DNA OR RNA); SEVERE ACUTE RESPIRATORY SYNDROME CORONAVIRUS 2 (SARS-COV-2) (CORONAVIRUS DISEASE [COVID-19]), AMPLIFIED PROBE TECHNIQUE, MAKING USE OF HIGH THROUGHPUT TECHNOLOGIES AS DESCRIBED BY CMS-2020-01-R: HCPCS | Performed by: PHYSICIAN ASSISTANT

## 2021-07-09 PROCEDURE — U0005 INFEC AGEN DETEC AMPLI PROBE: HCPCS | Performed by: PHYSICIAN ASSISTANT

## 2021-07-10 LAB
LABORATORY COMMENT REPORT: NORMAL
SARS-COV-2 RNA RESP QL NAA+PROBE: NEGATIVE
SPECIMEN SOURCE: NORMAL

## 2021-07-12 ENCOUNTER — TELEPHONE (OUTPATIENT)
Dept: MEDSURG UNIT | Facility: CLINIC | Age: 56
End: 2021-07-12

## 2021-07-12 NOTE — TELEPHONE ENCOUNTER
Pre-Procedure Negative COVID Test Results    Results Reviewed  The patient has a negative COVID test result within the required timeframe for the scheduled procedure.     No COVID pre-call needed.     SURINDER Reyez RN

## 2021-07-13 ENCOUNTER — HOSPITAL ENCOUNTER (OUTPATIENT)
Facility: CLINIC | Age: 56
Discharge: HOME | End: 2021-07-13
Admitting: PHYSICIAN ASSISTANT
Payer: COMMERCIAL

## 2021-07-13 ENCOUNTER — HOSPITAL ENCOUNTER (OUTPATIENT)
Dept: GENERAL RADIOLOGY | Facility: CLINIC | Age: 56
End: 2021-07-13
Attending: PHYSICIAN ASSISTANT
Payer: COMMERCIAL

## 2021-07-13 VITALS
DIASTOLIC BLOOD PRESSURE: 103 MMHG | RESPIRATION RATE: 16 BRPM | OXYGEN SATURATION: 98 % | HEART RATE: 133 BPM | SYSTOLIC BLOOD PRESSURE: 140 MMHG

## 2021-07-13 VITALS — DIASTOLIC BLOOD PRESSURE: 76 MMHG | HEART RATE: 127 BPM | OXYGEN SATURATION: 100 % | SYSTOLIC BLOOD PRESSURE: 143 MMHG

## 2021-07-13 DIAGNOSIS — M54.41 ACUTE RIGHT-SIDED LOW BACK PAIN WITH RIGHT-SIDED SCIATICA: ICD-10-CM

## 2021-07-13 PROCEDURE — 250N000009 HC RX 250: Performed by: PHYSICIAN ASSISTANT

## 2021-07-13 PROCEDURE — 255N000002 HC RX 255 OP 636: Performed by: PHYSICIAN ASSISTANT

## 2021-07-13 PROCEDURE — 62323 NJX INTERLAMINAR LMBR/SAC: CPT

## 2021-07-13 PROCEDURE — 250N000011 HC RX IP 250 OP 636: Performed by: PHYSICIAN ASSISTANT

## 2021-07-13 RX ORDER — IOPAMIDOL 408 MG/ML
10 INJECTION, SOLUTION INTRATHECAL ONCE
Status: COMPLETED | OUTPATIENT
Start: 2021-07-13 | End: 2021-07-13

## 2021-07-13 RX ORDER — DEXAMETHASONE SODIUM PHOSPHATE 10 MG/ML
20 INJECTION, SOLUTION INTRAMUSCULAR; INTRAVENOUS ONCE
Status: COMPLETED | OUTPATIENT
Start: 2021-07-13 | End: 2021-07-13

## 2021-07-13 RX ORDER — LIDOCAINE HYDROCHLORIDE 10 MG/ML
30 INJECTION, SOLUTION EPIDURAL; INFILTRATION; INTRACAUDAL; PERINEURAL ONCE
Status: COMPLETED | OUTPATIENT
Start: 2021-07-13 | End: 2021-07-13

## 2021-07-13 RX ADMIN — DEXAMETHASONE SODIUM PHOSPHATE 20 MG: 10 INJECTION, SOLUTION INTRAMUSCULAR; INTRAVENOUS at 12:00

## 2021-07-13 RX ADMIN — LIDOCAINE HYDROCHLORIDE 6 ML: 10 INJECTION, SOLUTION EPIDURAL; INFILTRATION; INTRACAUDAL; PERINEURAL at 11:55

## 2021-07-13 RX ADMIN — IOPAMIDOL 1 ML: 408 INJECTION, SOLUTION INTRATHECAL at 12:00

## 2021-07-13 NOTE — PROCEDURES
[facility]    Procedure: Right L5-S1 transforaminal ROLANDO    Date/Time: 7/13/2021 12:04 PM  Performed by: Ronda Beatty PA-C  Authorized by: Ronda Beatty PA-C     UNIVERSAL PROTOCOL   Site Marked: Yes  Prior Images Obtained and Reviewed:  Yes  Required items: Required blood products, implants, devices and special equipment available    Patient identity confirmed:  Verbally with patient  Patient was reevaluated immediately before administering moderate or deep sedation or anesthesia  Confirmation Checklist:  Patient's identity using two indicators, relevant allergies, procedure was appropriate and matched the consent or emergent situation and correct equipment/implants were available  Time out: Immediately prior to the procedure a time out was called    Universal Protocol: the Joint Commission Universal Protocol was followed    Preparation: Patient was prepped and draped in usual sterile fashion           ANESTHESIA    Anesthesia: Local infiltration  Local Anesthetic:  Lidocaine 1% without epinephrine      SEDATION    Patient Sedated: No    See dictated procedure note for full details.  PROCEDURE   Patient Tolerance:  Patient tolerated the procedure well with no immediate complications  Describe Procedure: Right L5-S1 TF ROLANDO. Counting correlates with transitional anatomy noted on MRI, including rib at L1 and lumbarized S1.   Length of time physician/provider present for 1:1 monitoring during sedation: 0

## 2021-07-13 NOTE — PROGRESS NOTES
Care Suites Post Procedure Note    Patient Information  Name: Kolton Kang  Age: 55 year old    Post Procedure  Time patient returned to Care Suites: 1210  Concerns/abnormal assessment: HR 120s-130s, PAT Thomas at bedside and notified. No interventions at this time. Pt having 8/10 pain to right leg with numbness/tingling to right leg, this is baseline for him. Right lower back steroid injection site covered with bandaid, CDI.  If abnormal assessment, provider notified: Yes  Plan/Other: Provide discharge instructions and discharge with ride at bedside at 1230.    Elzbieta Ray RN

## 2021-07-13 NOTE — DISCHARGE INSTRUCTIONS

## 2021-07-13 NOTE — PROGRESS NOTES
Care Suites Discharge Nursing Note    Patient Information  Name: Kolton Kang  Age: 55 year old    Discharge Education:  Discharge instructions reviewed: Yes.  Additional education/resources provided: AVS given and reviewed.  Patient/patient representative verbalizes understanding: Yes  Patient discharging on new medications: No.  Medication education completed: N/A    Discharge Plans:   Discharge location: home  Discharge ride contacted: Yes  Approximate discharge time: 1240    Discharge Criteria:  Discharge criteria met and vital signs stable: Yes.  Pain level at baseline.  Able to stand and ambulate short distances.  Steady on his feet.    Patient Belongs:  Patient belongings returned to patient: Yes    Shayele Kearns RN

## 2021-07-21 NOTE — H&P (VIEW-ONLY)
[facility] [place]  [address], [address]  [place] [address]  Phone: [phone]  Fax: [phone]  Primary Provider: Sheryl Moy  Pre-op Performing Provider: SHERYL MOY    PREOPERATIVE EVALUATION:  Today's date: 7/22/2021    Kolton Kang is a 55 year old male who presents for a preoperative evaluation.    Surgical Information:  Surgery/Procedure: Spinal Surgery  Surgery [facility]   Surgeon: Sabino Ruiz PA-C/ Antony Landeros MD  Surgery Date: Teanitive for 5th or the 11th  Time of Surgery: TBD  Where patient plans to recover: At home with family  Fax number for surgical facility: Note does not need to be faxed, will be available electronically in Epic.    Type of Anesthesia Anticipated: General    Assessment & Plan     The proposed surgical procedure is considered INTERMEDIATE risk.    Preop general physical exam  Microdiscectomy given lumbar radiculopathy.  Cleared for surgery.  - Basic metabolic panel  (Ca, Cl, CO2, Creat, Gluc, K, Na, BUN); Future  - EKG 12-lead complete w/read - Clinics  - CBC with platelets and differential; Future  - Basic metabolic panel  (Ca, Cl, CO2, Creat, Gluc, K, Na, BUN)  - CBC with platelets and differential    Lumbar radiculopathy  Above.           Risks and Recommendations:  The patient has the following additional risks and recommendations for perioperative complications:   - No identified additional risk factors other than previously addressed    RECOMMENDATION:  APPROVAL GIVEN to proceed with proposed procedure, without further diagnostic evaluation.      Subjective     HPI related to upcoming procedure: History of worsening lumbar radiculopathy on right side, plan discectomy      Preop Questions 7/22/2021   1. Have you ever had a heart attack or stroke? No   2. Have you ever had surgery on your heart or blood vessels, such as a stent placement, a coronary artery bypass, or surgery on an artery in your head, neck,  heart, or legs? No   3. Do you have chest pain with activity? No   4. Do you have a history of  heart failure? No   5. Do you currently have a cold, bronchitis or symptoms of other infection? No   6. Do you have a cough, shortness of breath, or wheezing? No   7. Do you or anyone in your family have previous history of blood clots? No   8. Do you or does anyone in your family have a serious bleeding problem such as prolonged bleeding following surgeries or cuts? No   9. Have you ever had problems with anemia or been told to take iron pills? No   10. Have you had any abnormal blood loss such as black, tarry or bloody stools? No   11. Have you ever had a blood transfusion? No   12. Are you willing to have a blood transfusion if it is medically needed before, during, or after your surgery? Yes   13. Have you or any of your relatives ever had problems with anesthesia? No   14. Do you have sleep apnea, excessive snoring or daytime drowsiness? No   15. Do you have any artifical heart valves or other implanted medical devices like a pacemaker, defibrillator, or continuous glucose monitor? No   16. Do you have artificial joints? No   17. Are you allergic to latex? No     Health Care Directive:  Patient does not have a Health Care Directive or Living Will:    Preoperative Review of :   reviewed - no record of controlled substances prescribed.      Review of Systems  Constitutional, neuro, ENT, endocrine, pulmonary, cardiac, gastrointestinal, genitourinary, musculoskeletal, integument and psychiatric systems are negative, except as otherwise noted.    Patient Active Problem List    Diagnosis Date Noted     Family history of malignant melanoma of skin 12/02/2016     Priority: Medium     Family history of bladder cancer, father 12/02/2016     Priority: Medium     Tobacco use disorder 12/02/2016     Priority: Medium     Overweight (BMI 25.0-29.9) 12/02/2016     Priority: Medium     Family history of ischemic heart disease  12/02/2016     Priority: Medium     History of chronic sinusitis 12/02/2016     Priority: Medium     Elevated fasting glucose 12/02/2016     Priority: Medium      No past medical history on file.  Past Surgical History:   Procedure Laterality Date     COLONOSCOPY WITH CO2 INSUFFLATION N/A 12/16/2016    Procedure: COLONOSCOPY WITH CO2 INSUFFLATION;  Surgeon: Mya Olea MD;  Location: [facility] OR     EXTRACTION(S) DENTAL       Current Outpatient Medications   Medication Sig Dispense Refill     cyclobenzaprine (FLEXERIL) 10 MG tablet Take 1 tablet (10 mg) by mouth 3 times daily as needed for muscle spasms 21 tablet 0     gabapentin (NEURONTIN) 100 MG capsule Take 2 capsules (200 mg) by mouth 3 times daily 90 capsule 1     gabapentin (NEURONTIN) 100 MG capsule Take 1 capsule (100 mg) by mouth 2 times daily 60 capsule 0     Naproxen Sodium (ALEVE PO) Take 440 mg by mouth 3 times daily (with meals)         No Known Allergies     Social History     Tobacco Use     Smoking status: Current Every Day Smoker     Packs/day: 1.50     Years: 20.00     Pack years: 30.00     Types: Cigarettes     Smokeless tobacco: Never Used     Tobacco comment: Thinking about quitting in the next year   Substance Use Topics     Alcohol use: Yes     Comment: 10 drinks per week      Family History   Problem Relation Age of Onset     Cerebrovascular Disease Mother      Bladder Cancer Father      Skin Cancer Father         melanoma     Myocardial Infarction Brother 60     History   Drug Use No         Objective     There were no vitals taken for this visit.    Physical Exam    GENERAL APPEARANCE: healthy, alert and no distress     EYES: EOMI,  PERRL     HENT: ear canals and TM's normal and nose and mouth without ulcers or lesions     NECK: no adenopathy, no asymmetry, masses, or scars and thyroid normal to palpation     RESP: lungs clear to auscultation - no rales, rhonchi or wheezes     CV: regular rates and rhythm, normal S1 S2, no S3 or S4 and  no murmur, click or rub     ABDOMEN:  soft, nontender, no HSM or masses and bowel sounds normal     MS: extremities normal- no gross deformities noted, no evidence of inflammation in joints, FROM in all extremities.     SKIN: no suspicious lesions or rashes     NEURO: Normal strength and tone, sensory exam grossly normal, mentation intact and speech normal     PSYCH: mentation appears normal. and affect normal/bright     LYMPHATICS: No cervical adenopathy    No results for input(s): HGB, PLT, INR, NA, POTASSIUM, CR, A1C in the last 26695 hours.     Diagnostics:  Labs pending at this time.  Results will be reviewed when available.   EKG: appears normal, NSR, normal axis, normal intervals, no acute ST/T changes c/w ischemia, no LVH by voltage criteria    Revised Cardiac Risk Index (RCRI):  The patient has the following serious cardiovascular risks for perioperative complications:   - No serious cardiac risks = 0 points     RCRI Interpretation: 0 points: Class I (very low risk - 0.4% complication rate)           Signed Electronically by: Ron Terrazas MD  Copy of this evaluation report is provided to requesting physician.

## 2021-07-21 NOTE — PROGRESS NOTES
[facility] [place]  [address], [address]  [place] [address]  Phone: [phone]  Fax: [phone]  Primary Provider: Sheryl Moy  Pre-op Performing Provider: SHERYL MOY    PREOPERATIVE EVALUATION:  Today's date: 7/22/2021    Kolton Kang is a 55 year old male who presents for a preoperative evaluation.    Surgical Information:  Surgery/Procedure: Spinal Surgery  Surgery Location: [facility]   Surgeon: Sabino Ruiz PA-C/ Antony Landeros MD  Surgery Date: Teanitive for 5th or the 11th  Time of Surgery: TBD  Where patient plans to recover: At home with family  Fax number for surgical facility: Note does not need to be faxed, will be available electronically in Epic.    Type of Anesthesia Anticipated: General    Assessment & Plan     The proposed surgical procedure is considered INTERMEDIATE risk.    Preop general physical exam  Microdiscectomy given lumbar radiculopathy.  Cleared for surgery.  - Basic metabolic panel  (Ca, Cl, CO2, Creat, Gluc, K, Na, BUN); Future  - EKG 12-lead complete w/read - Clinics  - CBC with platelets and differential; Future  - Basic metabolic panel  (Ca, Cl, CO2, Creat, Gluc, K, Na, BUN)  - CBC with platelets and differential    Lumbar radiculopathy  Above.           Risks and Recommendations:  The patient has the following additional risks and recommendations for perioperative complications:   - No identified additional risk factors other than previously addressed    RECOMMENDATION:  APPROVAL GIVEN to proceed with proposed procedure, without further diagnostic evaluation.      Subjective     HPI related to upcoming procedure: History of worsening lumbar radiculopathy on right side, plan discectomy      Preop Questions 7/22/2021   1. Have you ever had a heart attack or stroke? No   2. Have you ever had surgery on your heart or blood vessels, such as a stent placement, a coronary artery bypass, or surgery on an artery in your head, neck,  heart, or legs? No   3. Do you have chest pain with activity? No   4. Do you have a history of  heart failure? No   5. Do you currently have a cold, bronchitis or symptoms of other infection? No   6. Do you have a cough, shortness of breath, or wheezing? No   7. Do you or anyone in your family have previous history of blood clots? No   8. Do you or does anyone in your family have a serious bleeding problem such as prolonged bleeding following surgeries or cuts? No   9. Have you ever had problems with anemia or been told to take iron pills? No   10. Have you had any abnormal blood loss such as black, tarry or bloody stools? No   11. Have you ever had a blood transfusion? No   12. Are you willing to have a blood transfusion if it is medically needed before, during, or after your surgery? Yes   13. Have you or any of your relatives ever had problems with anesthesia? No   14. Do you have sleep apnea, excessive snoring or daytime drowsiness? No   15. Do you have any artifical heart valves or other implanted medical devices like a pacemaker, defibrillator, or continuous glucose monitor? No   16. Do you have artificial joints? No   17. Are you allergic to latex? No     Health Care Directive:  Patient does not have a Health Care Directive or Living Will:    Preoperative Review of :   reviewed - no record of controlled substances prescribed.      Review of Systems  Constitutional, neuro, ENT, endocrine, pulmonary, cardiac, gastrointestinal, genitourinary, musculoskeletal, integument and psychiatric systems are negative, except as otherwise noted.    Patient Active Problem List    Diagnosis Date Noted     Family history of malignant melanoma of skin 12/02/2016     Priority: Medium     Family history of bladder cancer, father 12/02/2016     Priority: Medium     Tobacco use disorder 12/02/2016     Priority: Medium     Overweight (BMI 25.0-29.9) 12/02/2016     Priority: Medium     Family history of ischemic heart disease  12/02/2016     Priority: Medium     History of chronic sinusitis 12/02/2016     Priority: Medium     Elevated fasting glucose 12/02/2016     Priority: Medium      No past medical history on file.  Past Surgical History:   Procedure Laterality Date     COLONOSCOPY WITH CO2 INSUFFLATION N/A 12/16/2016    Procedure: COLONOSCOPY WITH CO2 INSUFFLATION;  Surgeon: Mya Olea MD;  Location: [facility] OR     EXTRACTION(S) DENTAL       Current Outpatient Medications   Medication Sig Dispense Refill     cyclobenzaprine (FLEXERIL) 10 MG tablet Take 1 tablet (10 mg) by mouth 3 times daily as needed for muscle spasms 21 tablet 0     gabapentin (NEURONTIN) 100 MG capsule Take 2 capsules (200 mg) by mouth 3 times daily 90 capsule 1     gabapentin (NEURONTIN) 100 MG capsule Take 1 capsule (100 mg) by mouth 2 times daily 60 capsule 0     Naproxen Sodium (ALEVE PO) Take 440 mg by mouth 3 times daily (with meals)         No Known Allergies     Social History     Tobacco Use     Smoking status: Current Every Day Smoker     Packs/day: 1.50     Years: 20.00     Pack years: 30.00     Types: Cigarettes     Smokeless tobacco: Never Used     Tobacco comment: Thinking about quitting in the next year   Substance Use Topics     Alcohol use: Yes     Comment: 10 drinks per week      Family History   Problem Relation Age of Onset     Cerebrovascular Disease Mother      Bladder Cancer Father      Skin Cancer Father         melanoma     Myocardial Infarction Brother 60     History   Drug Use No         Objective     There were no vitals taken for this visit.    Physical Exam    GENERAL APPEARANCE: healthy, alert and no distress     EYES: EOMI,  PERRL     HENT: ear canals and TM's normal and nose and mouth without ulcers or lesions     NECK: no adenopathy, no asymmetry, masses, or scars and thyroid normal to palpation     RESP: lungs clear to auscultation - no rales, rhonchi or wheezes     CV: regular rates and rhythm, normal S1 S2, no S3 or S4 and  no murmur, click or rub     ABDOMEN:  soft, nontender, no HSM or masses and bowel sounds normal     MS: extremities normal- no gross deformities noted, no evidence of inflammation in joints, FROM in all extremities.     SKIN: no suspicious lesions or rashes     NEURO: Normal strength and tone, sensory exam grossly normal, mentation intact and speech normal     PSYCH: mentation appears normal. and affect normal/bright     LYMPHATICS: No cervical adenopathy    No results for input(s): HGB, PLT, INR, NA, POTASSIUM, CR, A1C in the last 80674 hours.     Diagnostics:  Labs pending at this time.  Results will be reviewed when available.   EKG: appears normal, NSR, normal axis, normal intervals, no acute ST/T changes c/w ischemia, no LVH by voltage criteria    Revised Cardiac Risk Index (RCRI):  The patient has the following serious cardiovascular risks for perioperative complications:   - No serious cardiac risks = 0 points     RCRI Interpretation: 0 points: Class I (very low risk - 0.4% complication rate)           Signed Electronically by: Ron Terrazas MD  Copy of this evaluation report is provided to requesting physician.

## 2021-07-21 NOTE — PATIENT INSTRUCTIONS
Preparing for Your Surgery  Getting started  A nurse will call you to review your health history and instructions. They will give you an arrival time based on your scheduled surgery time.  Please be ready to share the following:    Your doctor's clinic name and phone number    Your medical, surgical and anesthesia history    A list of allergies and sensitivities    A list of medicines, including herbal treatments and over-the-counter drugs    Whether the patient has a legal guardian (ask how to send us the papers in advance)  If you have a child who's having surgery, please ask for a copy of Preparing for Your Child's Surgery.    Preparing for surgery    Within 30 days of surgery: Have a pre-op exam (sometimes called an H&P, or History and Physical). This can be done at a clinic or pre-operative center.  ? If you're having a , you may not need this exam. Talk to your care team    At your pre-op exam, talk to your care team about all medicines you take. If you need to stop any medicines before surgery, ask when to start taking them again.  ? We do this for your safety. Many medicines can make you bleed too much during surgery. Some change how well surgery (anesthesia) drugs work.    Call your insurance company to let them know you're having surgery. (If you don't have insurance, call [phone].)    Call your clinic if there's any change in your health. This includes signs of a cold or flu (sore throat, runny nose, cough, rash, fever). It also includes a scrape or scratch near the surgery site.    If you have questions on the day of surgery, call your hospital or surgery center.  Eating and drinking guidelines  For your safety: Unless your surgeon tells you otherwise, follow the guidelines below.    Eat and drink as usual until 8 hours before surgery. After that, no food or milk.    Drink clear liquids until 2 hours before surgery. These are liquids you can see through, like water, Gatorade and Propel  Water. You may also have black coffee and tea (no cream or milk).    Nothing by mouth within 2 hours of surgery. This includes gum, candy and breath mints.    If you drink, stop drinking alcohol the night before surgery.    If your care team tells you to take medicine on the morning of surgery, it's okay to take it with a sip of water.  Preventing infection    Shower or bathe the night before and morning of your surgery. Follow the instructions your clinic gave you. (If no instructions, use regular soap.)    Don't shave or clip hair near your surgery site. We'll remove the hair if needed.    Don't smoke or vape the morning of surgery. You may chew nicotine gum up to 2 hours before surgery. A nicotine patch is okay.  ? Note: Some surgeries require you to completely quit smoking and nicotine. Check with your surgeon.    Your care team will make every effort to keep you safe from infection. We will:  ? Clean our hands often with soap and water (or an alcohol-based hand rub).  ? Clean the skin at your surgery site with a special soap that kills germs.  ? Give you a special gown to keep you warm. (Cold raises the risk of infection.)  ? Wear special hair covers, masks, gowns and gloves during surgery.  ? Give antibiotic medicine, if prescribed. Not all surgeries need antibiotics.  What to bring on the day of surgery    Photo ID and insurance card    Copy of your health care directive, if you have one    Glasses and hearing aides (bring cases)  ? You can't wear contacts during surgery    Inhaler and eye drops, if you use them (tell us about these when you arrive)    CPAP machine or breathing device, if you use them    A few personal items, if spending the night    If you have . . .  ? A pacemaker or ICD (cardiac defibrillator): Bring the ID card.  ? An implanted stimulator: Bring the remote control.  ? A legal guardian: Bring a copy of the certified (court-stamped) guardianship papers.  Please remove any jewelry, including  body piercings. Leave jewelry and other valuables at home.  If you're going home the day of surgery  Important: If you don't follow the rules below, we must cancel your surgery.     Arrange for someone to drive you home after surgery. You may not drive, take a taxi or take public transportation by yourself (unless you'll have local anesthesia only).    Arrange for a responsible adult to stay with you overnight. If you don't, we may keep you in the hospital overnight, and you may need to pay the costs yourself.  Questions?   If you have any questions for your care team, list them here: _________________________________________________________________________________________________________________________________________________________________________________________________________________________________________________________________________________________________________________________  For informational purposes only. Not to replace the advice of your health care provider. Copyright   2003, 2019 [employer]. All rights reserved. Clinically reviewed by Shantelle Barrett MD. [employer]ART[employer] 815588 - REV 4/20.    Preventive Health Recommendations  Male Ages 50 - 64    Yearly exam:             See your health care provider every year in order to  o   Review health changes.   o   Discuss preventive care.    o   Review your medicines if your doctor has prescribed any.     Have a cholesterol test every 5 years, or more frequently if you are at risk for high cholesterol/heart disease.     Have a diabetes test (fasting glucose) every three years. If you are at risk for diabetes, you should have this test more often.     Have a colonoscopy at age 50, or have a yearly FIT test (stool test). These exams will check for colon cancer.      Talk with your health care provider about whether or not a prostate cancer screening test (PSA) is right for you.    You should be tested each year for STDs (sexually transmitted  diseases), if you re at risk.     Shots: Get a flu shot each year. Get a tetanus shot every 10 years.     Nutrition:    Eat at least 5 servings of fruits and vegetables daily.     Eat whole-grain bread, whole-wheat pasta and brown rice instead of white grains and rice.     Get adequate Calcium and Vitamin D.     Lifestyle    Exercise for at least 150 minutes a week (30 minutes a day, 5 days a week). This will help you control your weight and prevent disease.     Limit alcohol to one drink per day.     No smoking.     Wear sunscreen to prevent skin cancer.     See your dentist every six months for an exam and cleaning.     See your eye doctor every 1 to 2 years.

## 2021-07-22 ENCOUNTER — OFFICE VISIT (OUTPATIENT)
Dept: FAMILY MEDICINE | Facility: CLINIC | Age: 56
End: 2021-07-22
Payer: COMMERCIAL

## 2021-07-22 VITALS
HEIGHT: 70 IN | TEMPERATURE: 98.4 F | OXYGEN SATURATION: 98 % | RESPIRATION RATE: 14 BRPM | HEART RATE: 115 BPM | WEIGHT: 185 LBS | BODY MASS INDEX: 26.48 KG/M2 | DIASTOLIC BLOOD PRESSURE: 74 MMHG | SYSTOLIC BLOOD PRESSURE: 126 MMHG

## 2021-07-22 DIAGNOSIS — Z00.00 ROUTINE GENERAL MEDICAL EXAMINATION AT A HEALTH CARE FACILITY: ICD-10-CM

## 2021-07-22 DIAGNOSIS — R42 DIZZINESS: ICD-10-CM

## 2021-07-22 DIAGNOSIS — M54.16 LUMBAR RADICULOPATHY: ICD-10-CM

## 2021-07-22 DIAGNOSIS — Z01.818 PREOP GENERAL PHYSICAL EXAM: Primary | ICD-10-CM

## 2021-07-22 LAB
ANION GAP SERPL CALCULATED.3IONS-SCNC: 3 MMOL/L (ref 3–14)
BASOPHILS # BLD AUTO: 0 10E3/UL (ref 0–0.2)
BASOPHILS NFR BLD AUTO: 0 %
BUN SERPL-MCNC: 18 MG/DL (ref 7–30)
CALCIUM SERPL-MCNC: 9.2 MG/DL (ref 8.5–10.1)
CHLORIDE BLD-SCNC: 108 MMOL/L (ref 94–109)
CHOLEST SERPL-MCNC: 196 MG/DL
CO2 SERPL-SCNC: 28 MMOL/L (ref 20–32)
CREAT SERPL-MCNC: 0.99 MG/DL (ref 0.66–1.25)
EOSINOPHIL # BLD AUTO: 0.1 10E3/UL (ref 0–0.7)
EOSINOPHIL NFR BLD AUTO: 1 %
ERYTHROCYTE [DISTWIDTH] IN BLOOD BY AUTOMATED COUNT: 12.5 % (ref 10–15)
FASTING STATUS PATIENT QL REPORTED: ABNORMAL
GFR SERPL CREATININE-BSD FRML MDRD: 84 ML/MIN/1.73M2
GLUCOSE BLD-MCNC: 93 MG/DL (ref 70–99)
HBA1C MFR BLD: 5 % (ref 0–5.6)
HCT VFR BLD AUTO: 42.8 % (ref 40–53)
HDLC SERPL-MCNC: 79 MG/DL
HGB BLD-MCNC: 14.9 G/DL (ref 13.3–17.7)
LDLC SERPL CALC-MCNC: 66 MG/DL
LYMPHOCYTES # BLD AUTO: 1.4 10E3/UL (ref 0.8–5.3)
LYMPHOCYTES NFR BLD AUTO: 23 %
MCH RBC QN AUTO: 33.3 PG (ref 26.5–33)
MCHC RBC AUTO-ENTMCNC: 34.8 G/DL (ref 31.5–36.5)
MCV RBC AUTO: 96 FL (ref 78–100)
MONOCYTES # BLD AUTO: 0.9 10E3/UL (ref 0–1.3)
MONOCYTES NFR BLD AUTO: 15 %
NEUTROPHILS # BLD AUTO: 3.7 10E3/UL (ref 1.6–8.3)
NEUTROPHILS NFR BLD AUTO: 61 %
NONHDLC SERPL-MCNC: 117 MG/DL
PLATELET # BLD AUTO: 202 10E3/UL (ref 150–450)
POTASSIUM BLD-SCNC: 5 MMOL/L (ref 3.4–5.3)
RBC # BLD AUTO: 4.47 10E6/UL (ref 4.4–5.9)
SODIUM SERPL-SCNC: 139 MMOL/L (ref 133–144)
TRIGL SERPL-MCNC: 254 MG/DL
TSH SERPL DL<=0.005 MIU/L-ACNC: 2.3 MU/L (ref 0.4–4)
WBC # BLD AUTO: 6 10E3/UL (ref 4–11)

## 2021-07-22 PROCEDURE — 80061 LIPID PANEL: CPT | Performed by: FAMILY MEDICINE

## 2021-07-22 PROCEDURE — 99214 OFFICE O/P EST MOD 30 MIN: CPT | Mod: 25 | Performed by: FAMILY MEDICINE

## 2021-07-22 PROCEDURE — 93000 ELECTROCARDIOGRAM COMPLETE: CPT | Performed by: FAMILY MEDICINE

## 2021-07-22 PROCEDURE — 85025 COMPLETE CBC W/AUTO DIFF WBC: CPT | Performed by: FAMILY MEDICINE

## 2021-07-22 PROCEDURE — 80048 BASIC METABOLIC PNL TOTAL CA: CPT | Performed by: FAMILY MEDICINE

## 2021-07-22 PROCEDURE — 99396 PREV VISIT EST AGE 40-64: CPT | Performed by: FAMILY MEDICINE

## 2021-07-22 PROCEDURE — 83036 HEMOGLOBIN GLYCOSYLATED A1C: CPT | Performed by: FAMILY MEDICINE

## 2021-07-22 PROCEDURE — 36415 COLL VENOUS BLD VENIPUNCTURE: CPT | Performed by: FAMILY MEDICINE

## 2021-07-22 PROCEDURE — 84443 ASSAY THYROID STIM HORMONE: CPT | Performed by: FAMILY MEDICINE

## 2021-07-22 ASSESSMENT — MIFFLIN-ST. JEOR: SCORE: 1662.46

## 2021-07-22 ASSESSMENT — PAIN SCALES - GENERAL: PAINLEVEL: WORST PAIN (10)

## 2021-07-22 NOTE — RESULT ENCOUNTER NOTE
Kolton,  Your recent studies showed a normal blood count and hemoglobin A1c.  Please let me know if you have any questions or concerns and follow up as discussed in clinic.    Sincerely.  Dr. JEFF Terrazas MD, FAAFP  Family Medicine Physician  [facility]  [address] [place] [address]

## 2021-07-22 NOTE — PROGRESS NOTES
SUBJECTIVE:   CC: Kolton Kang is an 55 year old male who presents for preventative health visit.       Patient has been advised of split billing requirements and indicates understanding: Yes  Healthy Habits:    Getting at least 3 servings of Calcium per day:  Yes    Bi-annual eye exam:  Yes    Dental care twice a year:  Yes    Sleep apnea or symptoms of sleep apnea:  None    Diet:  Regular (no restrictions)    Frequency of exercise:  None (due to injury)    Duration of exercise:  N/A    Taking medications regularly:  No    Barriers to taking medications:  Not applicable    Medication side effects:  None    PHQ-2 Total Score:    Additional concerns today:  Yes (also here for pre op. struggling with vertigo- feel like he is on a boat. fell last week- injured ribs)    Today's PHQ-2 Score:   PHQ-2 ( 1999 [employer]) 7/2/2021   Q1: Little interest or pleasure in doing things 0   Q2: Feeling down, depressed or hopeless 0   PHQ-2 Score 0       Abuse: Current or Past(Physical, Sexual or Emotional)- No  Do you feel safe in your environment? Yes    Have you ever done Advance Care Planning? (For example, a Health Directive, POLST, or a discussion with a medical provider or your loved ones about your wishes): Not addressed today    Social History     Tobacco Use     Smoking status: Current Every Day Smoker     Packs/day: 1.50     Years: 20.00     Pack years: 30.00     Types: Cigarettes     Smokeless tobacco: Never Used     Tobacco comment: Thinking about quitting in the next year   Substance Use Topics     Alcohol use: Yes     Comment: 10 drinks per week      If you drink alcohol do you typically have >3 drinks per day or >7 drinks per week? Yes declines other assessment- says its due to pain.         Last PSA:   PSA   Date Value Ref Range Status   12/02/2016 0.88 0 - 4 ug/L Final     Comment:     Assay Method:  Chemiluminescence using Siemens Vista analyzer       Reviewed orders with patient. Reviewed health maintenance and  "updated orders accordingly - Yes  Lab work is in process  Labs reviewed in EPIC  BP Readings from Last 3 Encounters:   07/22/21 126/74   07/13/21 (!) 143/76   07/13/21 (!) 140/103    Wt Readings from Last 3 Encounters:   07/22/21 83.9 kg (185 lb)   05/27/21 84.8 kg (187 lb)   01/15/21 87.1 kg (192 lb)                    Reviewed and updated as needed this visit by clinical staff                 Reviewed and updated as needed this visit by Provider                History reviewed. No pertinent past medical history.   Past Surgical History:   Procedure Laterality Date     COLONOSCOPY WITH CO2 INSUFFLATION N/A 12/16/2016    Procedure: COLONOSCOPY WITH CO2 INSUFFLATION;  Surgeon: Mya Olea MD;  Location: [facility] OR     EXTRACTION(S) DENTAL         Review of Systems  CONSTITUTIONAL: NEGATIVE for fever, chills, change in weight  INTEGUMENTARY/SKIN: NEGATIVE for worrisome rashes, moles or lesions  EYES: NEGATIVE for vision changes or irritation  ENT: NEGATIVE for ear, mouth and throat problems  RESP: NEGATIVE for significant cough or SOB  CV: NEGATIVE for chest pain, palpitations or peripheral edema  GI: NEGATIVE for nausea, abdominal pain, heartburn, or change in bowel habits   male: negative for dysuria, hematuria, decreased urinary stream, erectile dysfunction, urethral discharge  PSYCHIATRIC: NEGATIVE for changes in mood or affect    OBJECTIVE:   /74   Pulse 115   Temp 98.4  F (36.9  C) (Temporal)   Resp 14   Ht 1.765 m (5' 9.5\")   Wt 83.9 kg (185 lb)   SpO2 98%   BMI 26.93 kg/m      Physical Exam  GENERAL: healthy, alert and no distress  EYES: Eyes grossly normal to inspection, PERRL and conjunctivae and sclerae normal  HENT: ear canals and TM's normal, nose and mouth without ulcers or lesions  NECK: no adenopathy, no asymmetry, masses, or scars and thyroid normal to palpation  RESP: lungs clear to auscultation - no rales, rhonchi or wheezes  CV: regular rate and rhythm, normal S1 S2, no S3 or S4, " no murmur, click or rub, no peripheral edema and peripheral pulses strong  ABDOMEN: soft, nontender, no hepatosplenomegaly, no masses and bowel sounds normal  MS: no gross musculoskeletal defects noted, no edema  NEURO: Normal strength and tone, mentation intact and speech normal  Comprehensive back pain exam:  No tenderness, Pain limits the following motions: Flexion and extension, Lower extremity strength functional and equal on both sides, Lower extremity reflexes within normal limits bilaterally, Lower extremity sensation normal and equal on both sides and Straight leg positive on  right, indicating possible ipsilateral radiculopathy  PSYCH: mentation appears normal, affect normal/bright    Diagnostic Test Results:  Labs reviewed in Epic    ASSESSMENT/PLAN:   1. Preop general physical exam  See below for discussion on preoperative evaluation  - Basic metabolic panel  (Ca, Cl, CO2, Creat, Gluc, K, Na, BUN); Future  - EKG 12-lead complete w/read - Clinics  - CBC with platelets and differential; Future  - Basic metabolic panel  (Ca, Cl, CO2, Creat, Gluc, K, Na, BUN)  - CBC with platelets and differential    2. Routine general medical examination at a health care facility  55-year-old male here for annual well exam.  Also here for preoperative evaluation.  We discussed current cancer screening guidelines.  See below for other issues addressed.  - Lipid panel reflex to direct LDL Fasting; Future  - Hemoglobin A1c; Future  - Lipid panel reflex to direct LDL Fasting  - Hemoglobin A1c    3. Dizziness  Patient has occasional dizziness looking down to the right, seems to be positional in nature, worse when he wakes up in the morning, states he has to sleep on his left side because of his back pain.  We discussed possibility of needing carotid ultrasound, versus benign causes such as BPPV.  He will follow-up with me after his procedure for further evaluation.  - TSH with free T4 reflex; Future  - EKG 12-lead complete  "w/read - Clinics  - CBC with platelets and differential; Future  - TSH with free T4 reflex  - CBC with platelets and differential    4. Lumbar radiculopathy  Known lumbar radiculopathy on the right, upcoming microdiscectomy.      Patient has been advised of split billing requirements and indicates understanding: Yes  COUNSELING:   Reviewed preventive health counseling, as reflected in patient instructions       Regular exercise       Healthy diet/nutrition       Colon cancer screening       Prostate cancer screening    Estimated body mass index is 27.01 kg/m  as calculated from the following:    Height as of 5/27/21: 1.772 m (5' 9.76\").    Weight as of 5/27/21: 84.8 kg (187 lb).         He reports that he has been smoking cigarettes. He has a 30.00 pack-year smoking history. He has never used smokeless tobacco.  Tobacco Cessation Action Plan:   Not addressed this visit      Counseling Resources:  ATP IV Guidelines  Pooled Cohorts Equation Calculator  FRAX Risk Assessment  ICSI Preventive Guidelines  Dietary Guidelines for Americans, 2010  USDA's MyPlate  ASA Prophylaxis  Lung CA Screening    Ron Terrazas MD  [facility]  "

## 2021-07-23 NOTE — RESULT ENCOUNTER NOTE
Kolton,  Your recent studies showed a mildly elevated triglycerides, otherwise unremarkable.  Please let me know if you have any questions or concerns and follow up as discussed in clinic.    Sincerely.  Dr. JEFF Terrazas MD, FAAFP  Family Medicine Physician  [facility] [place]  [address] [place] [address]

## 2021-07-27 DIAGNOSIS — Z11.59 ENCOUNTER FOR SCREENING FOR OTHER VIRAL DISEASES: ICD-10-CM

## 2021-07-27 PROBLEM — M54.41 ACUTE RIGHT-SIDED LOW BACK PAIN WITH RIGHT-SIDED SCIATICA: Status: ACTIVE | Noted: 2021-07-27

## 2021-07-29 ENCOUNTER — TELEPHONE (OUTPATIENT)
Dept: NEUROSURGERY | Facility: CLINIC | Age: 56
End: 2021-07-29

## 2021-07-29 NOTE — TELEPHONE ENCOUNTER
Patient is requesting a return call to discuss pain management prior to surgery, he can be reached at [phone].

## 2021-07-30 NOTE — TELEPHONE ENCOUNTER
Left leg pain fortunately we do not provide pain medication prior to surgery.  Please have the patient talk again with his primary care provider.

## 2021-07-30 NOTE — TELEPHONE ENCOUNTER
Called pt to inform him that his care team is unable to prescribe narcotics prior to surgery and that he will need to reach out to his PCP.

## 2021-07-30 NOTE — TELEPHONE ENCOUNTER
Last Visit: 7/2/21  Next Visit: 8/11/21 Right lumbar 5-sacral 1 hemilaminectomy and microdiscectomy; *lumbarized sacrum*    Name of Provider:  Dr. Landeros     Assessment: Pt is looking to 'get some pain meds' before surgery.  Pain is to his right leg rating 4 when lying down but when he is up oob doing anything the nerve pain gets to a 10/10.   Pt is taking Aleve and using ice.   Pt reached out to his PCP and they prescribed him Flexeril and Gabapentin but he is still wanting a narcotic to help with his pain.     Recommendation given:     Action needed from provider:

## 2021-08-08 ENCOUNTER — LAB (OUTPATIENT)
Dept: URGENT CARE | Facility: URGENT CARE | Age: 56
End: 2021-08-08
Attending: NEUROLOGICAL SURGERY
Payer: COMMERCIAL

## 2021-08-08 DIAGNOSIS — Z11.59 ENCOUNTER FOR SCREENING FOR OTHER VIRAL DISEASES: ICD-10-CM

## 2021-08-08 PROCEDURE — U0003 INFECTIOUS AGENT DETECTION BY NUCLEIC ACID (DNA OR RNA); SEVERE ACUTE RESPIRATORY SYNDROME CORONAVIRUS 2 (SARS-COV-2) (CORONAVIRUS DISEASE [COVID-19]), AMPLIFIED PROBE TECHNIQUE, MAKING USE OF HIGH THROUGHPUT TECHNOLOGIES AS DESCRIBED BY CMS-2020-01-R: HCPCS

## 2021-08-08 PROCEDURE — U0005 INFEC AGEN DETEC AMPLI PROBE: HCPCS

## 2021-08-09 LAB — SARS-COV-2 RNA RESP QL NAA+PROBE: NEGATIVE

## 2021-08-11 ENCOUNTER — HOSPITAL ENCOUNTER (OUTPATIENT)
Facility: CLINIC | Age: 56
Discharge: HOME | End: 2021-08-11
Attending: NEUROLOGICAL SURGERY | Admitting: NEUROLOGICAL SURGERY
Payer: COMMERCIAL

## 2021-08-11 ENCOUNTER — ANESTHESIA (OUTPATIENT)
Dept: SURGERY | Facility: CLINIC | Age: 56
End: 2021-08-11
Payer: COMMERCIAL

## 2021-08-11 ENCOUNTER — ANESTHESIA EVENT (OUTPATIENT)
Dept: SURGERY | Facility: CLINIC | Age: 56
End: 2021-08-11
Payer: COMMERCIAL

## 2021-08-11 ENCOUNTER — APPOINTMENT (OUTPATIENT)
Dept: GENERAL RADIOLOGY | Facility: CLINIC | Age: 56
End: 2021-08-11
Attending: NEUROLOGICAL SURGERY
Payer: COMMERCIAL

## 2021-08-11 VITALS
HEART RATE: 74 BPM | DIASTOLIC BLOOD PRESSURE: 98 MMHG | BODY MASS INDEX: 27.78 KG/M2 | SYSTOLIC BLOOD PRESSURE: 150 MMHG | TEMPERATURE: 97.6 F | HEIGHT: 69 IN | OXYGEN SATURATION: 96 % | WEIGHT: 187.6 LBS | RESPIRATION RATE: 18 BRPM

## 2021-08-11 DIAGNOSIS — M54.41 ACUTE RIGHT-SIDED LOW BACK PAIN WITH RIGHT-SIDED SCIATICA: ICD-10-CM

## 2021-08-11 PROCEDURE — 370N000017 HC ANESTHESIA TECHNICAL FEE, PER MIN: Performed by: NEUROLOGICAL SURGERY

## 2021-08-11 PROCEDURE — 710N000012 HC RECOVERY PHASE 2, PER MINUTE: Performed by: NEUROLOGICAL SURGERY

## 2021-08-11 PROCEDURE — 999N000179 XR SURGERY CARM FLUORO LESS THAN 5 MIN W STILLS

## 2021-08-11 PROCEDURE — 250N000025 HC SEVOFLURANE, PER MIN: Performed by: NEUROLOGICAL SURGERY

## 2021-08-11 PROCEDURE — 250N000013 HC RX MED GY IP 250 OP 250 PS 637: Performed by: PHYSICIAN ASSISTANT

## 2021-08-11 PROCEDURE — 250N000009 HC RX 250: Performed by: NURSE ANESTHETIST, CERTIFIED REGISTERED

## 2021-08-11 PROCEDURE — 999N000141 HC STATISTIC PRE-PROCEDURE NURSING ASSESSMENT: Performed by: NEUROLOGICAL SURGERY

## 2021-08-11 PROCEDURE — 250N000011 HC RX IP 250 OP 636: Performed by: NURSE ANESTHETIST, CERTIFIED REGISTERED

## 2021-08-11 PROCEDURE — 710N000009 HC RECOVERY PHASE 1, LEVEL 1, PER MIN: Performed by: NEUROLOGICAL SURGERY

## 2021-08-11 PROCEDURE — 272N000001 HC OR GENERAL SUPPLY STERILE: Performed by: NEUROLOGICAL SURGERY

## 2021-08-11 PROCEDURE — 63030 LAMOT DCMPRN NRV RT 1 LMBR: CPT | Mod: RT | Performed by: NEUROLOGICAL SURGERY

## 2021-08-11 PROCEDURE — 250N000011 HC RX IP 250 OP 636: Performed by: ANESTHESIOLOGY

## 2021-08-11 PROCEDURE — 258N000003 HC RX IP 258 OP 636: Performed by: NURSE ANESTHETIST, CERTIFIED REGISTERED

## 2021-08-11 PROCEDURE — 250N000011 HC RX IP 250 OP 636: Performed by: PHYSICIAN ASSISTANT

## 2021-08-11 PROCEDURE — 360N000084 HC SURGERY LEVEL 4 W/ FLUORO, PER MIN: Performed by: NEUROLOGICAL SURGERY

## 2021-08-11 PROCEDURE — 250N000009 HC RX 250: Performed by: NEUROLOGICAL SURGERY

## 2021-08-11 PROCEDURE — 250N000011 HC RX IP 250 OP 636: Performed by: NEUROLOGICAL SURGERY

## 2021-08-11 RX ORDER — FENTANYL CITRATE 50 UG/ML
INJECTION, SOLUTION INTRAMUSCULAR; INTRAVENOUS PRN
Status: DISCONTINUED | OUTPATIENT
Start: 2021-08-11 | End: 2021-08-11

## 2021-08-11 RX ORDER — DEXAMETHASONE SODIUM PHOSPHATE 4 MG/ML
4 INJECTION, SOLUTION INTRA-ARTICULAR; INTRALESIONAL; INTRAMUSCULAR; INTRAVENOUS; SOFT TISSUE EVERY 10 MIN PRN
Status: DISCONTINUED | OUTPATIENT
Start: 2021-08-11 | End: 2021-08-11 | Stop reason: HOSPADM

## 2021-08-11 RX ORDER — ONDANSETRON 4 MG/1
4 TABLET, ORALLY DISINTEGRATING ORAL EVERY 4 HOURS PRN
Status: DISCONTINUED | OUTPATIENT
Start: 2021-08-11 | End: 2021-08-11 | Stop reason: HOSPADM

## 2021-08-11 RX ORDER — OXYCODONE AND ACETAMINOPHEN 5; 325 MG/1; MG/1
1 TABLET ORAL EVERY 4 HOURS PRN
Status: DISCONTINUED | OUTPATIENT
Start: 2021-08-11 | End: 2021-08-11 | Stop reason: HOSPADM

## 2021-08-11 RX ORDER — SODIUM CHLORIDE, SODIUM LACTATE, POTASSIUM CHLORIDE, CALCIUM CHLORIDE 600; 310; 30; 20 MG/100ML; MG/100ML; MG/100ML; MG/100ML
INJECTION, SOLUTION INTRAVENOUS CONTINUOUS
Status: DISCONTINUED | OUTPATIENT
Start: 2021-08-11 | End: 2021-08-11 | Stop reason: HOSPADM

## 2021-08-11 RX ORDER — AMOXICILLIN 250 MG
1-2 CAPSULE ORAL 2 TIMES DAILY
Qty: 60 TABLET | Refills: 0 | Status: SHIPPED | OUTPATIENT
Start: 2021-08-11 | End: 2021-09-23

## 2021-08-11 RX ORDER — FENTANYL CITRATE 50 UG/ML
25 INJECTION, SOLUTION INTRAMUSCULAR; INTRAVENOUS EVERY 5 MIN PRN
Status: DISCONTINUED | OUTPATIENT
Start: 2021-08-11 | End: 2021-08-11 | Stop reason: HOSPADM

## 2021-08-11 RX ORDER — ACETAMINOPHEN 325 MG/1
650 TABLET ORAL
Status: DISCONTINUED | OUTPATIENT
Start: 2021-08-11 | End: 2021-08-11 | Stop reason: HOSPADM

## 2021-08-11 RX ORDER — PROPOFOL 10 MG/ML
INJECTION, EMULSION INTRAVENOUS PRN
Status: DISCONTINUED | OUTPATIENT
Start: 2021-08-11 | End: 2021-08-11

## 2021-08-11 RX ORDER — HYDROMORPHONE HCL IN WATER/PF 6 MG/30 ML
0.2 PATIENT CONTROLLED ANALGESIA SYRINGE INTRAVENOUS EVERY 5 MIN PRN
Status: DISCONTINUED | OUTPATIENT
Start: 2021-08-11 | End: 2021-08-11 | Stop reason: HOSPADM

## 2021-08-11 RX ORDER — CEFAZOLIN SODIUM 2 G/100ML
2 INJECTION, SOLUTION INTRAVENOUS
Status: COMPLETED | OUTPATIENT
Start: 2021-08-11 | End: 2021-08-11

## 2021-08-11 RX ORDER — TRIAMCINOLONE ACETONIDE 55 UG/1
2 SPRAY, METERED NASAL DAILY
COMMUNITY

## 2021-08-11 RX ORDER — LIDOCAINE HYDROCHLORIDE 20 MG/ML
INJECTION, SOLUTION INFILTRATION; PERINEURAL PRN
Status: DISCONTINUED | OUTPATIENT
Start: 2021-08-11 | End: 2021-08-11

## 2021-08-11 RX ORDER — LABETALOL HYDROCHLORIDE 5 MG/ML
10 INJECTION, SOLUTION INTRAVENOUS
Status: DISCONTINUED | OUTPATIENT
Start: 2021-08-11 | End: 2021-08-11 | Stop reason: HOSPADM

## 2021-08-11 RX ORDER — GLYCOPYRROLATE 0.2 MG/ML
INJECTION, SOLUTION INTRAMUSCULAR; INTRAVENOUS PRN
Status: DISCONTINUED | OUTPATIENT
Start: 2021-08-11 | End: 2021-08-11

## 2021-08-11 RX ORDER — SODIUM CHLORIDE, SODIUM LACTATE, POTASSIUM CHLORIDE, CALCIUM CHLORIDE 600; 310; 30; 20 MG/100ML; MG/100ML; MG/100ML; MG/100ML
INJECTION, SOLUTION INTRAVENOUS CONTINUOUS PRN
Status: DISCONTINUED | OUTPATIENT
Start: 2021-08-11 | End: 2021-08-11

## 2021-08-11 RX ORDER — ONDANSETRON 4 MG/1
4 TABLET, ORALLY DISINTEGRATING ORAL EVERY 30 MIN PRN
Status: DISCONTINUED | OUTPATIENT
Start: 2021-08-11 | End: 2021-08-11 | Stop reason: HOSPADM

## 2021-08-11 RX ORDER — MEPERIDINE HYDROCHLORIDE 25 MG/ML
12.5 INJECTION INTRAMUSCULAR; INTRAVENOUS; SUBCUTANEOUS
Status: DISCONTINUED | OUTPATIENT
Start: 2021-08-11 | End: 2021-08-11 | Stop reason: HOSPADM

## 2021-08-11 RX ORDER — METHOCARBAMOL 750 MG/1
750 TABLET, FILM COATED ORAL
Status: COMPLETED | OUTPATIENT
Start: 2021-08-11 | End: 2021-08-11

## 2021-08-11 RX ORDER — CEFAZOLIN SODIUM 2 G/100ML
2 INJECTION, SOLUTION INTRAVENOUS SEE ADMIN INSTRUCTIONS
Status: DISCONTINUED | OUTPATIENT
Start: 2021-08-11 | End: 2021-08-11 | Stop reason: HOSPADM

## 2021-08-11 RX ORDER — NEOSTIGMINE METHYLSULFATE 1 MG/ML
VIAL (ML) INJECTION PRN
Status: DISCONTINUED | OUTPATIENT
Start: 2021-08-11 | End: 2021-08-11

## 2021-08-11 RX ORDER — ONDANSETRON 2 MG/ML
INJECTION INTRAMUSCULAR; INTRAVENOUS PRN
Status: DISCONTINUED | OUTPATIENT
Start: 2021-08-11 | End: 2021-08-11

## 2021-08-11 RX ORDER — DIMENHYDRINATE 50 MG/ML
25 INJECTION, SOLUTION INTRAMUSCULAR; INTRAVENOUS
Status: DISCONTINUED | OUTPATIENT
Start: 2021-08-11 | End: 2021-08-11 | Stop reason: HOSPADM

## 2021-08-11 RX ORDER — ONDANSETRON 2 MG/ML
4 INJECTION INTRAMUSCULAR; INTRAVENOUS EVERY 30 MIN PRN
Status: DISCONTINUED | OUTPATIENT
Start: 2021-08-11 | End: 2021-08-11 | Stop reason: HOSPADM

## 2021-08-11 RX ORDER — HYDRALAZINE HYDROCHLORIDE 20 MG/ML
2.5-5 INJECTION INTRAMUSCULAR; INTRAVENOUS EVERY 10 MIN PRN
Status: DISCONTINUED | OUTPATIENT
Start: 2021-08-11 | End: 2021-08-11 | Stop reason: HOSPADM

## 2021-08-11 RX ORDER — DEXAMETHASONE SODIUM PHOSPHATE 4 MG/ML
INJECTION, SOLUTION INTRA-ARTICULAR; INTRALESIONAL; INTRAMUSCULAR; INTRAVENOUS; SOFT TISSUE PRN
Status: DISCONTINUED | OUTPATIENT
Start: 2021-08-11 | End: 2021-08-11

## 2021-08-11 RX ORDER — OXYCODONE HYDROCHLORIDE 5 MG/1
5-10 TABLET ORAL EVERY 4 HOURS PRN
Qty: 20 TABLET | Refills: 0 | Status: SHIPPED | OUTPATIENT
Start: 2021-08-11 | End: 2021-08-14

## 2021-08-11 RX ORDER — ALBUTEROL SULFATE 0.83 MG/ML
2.5 SOLUTION RESPIRATORY (INHALATION) EVERY 4 HOURS PRN
Status: DISCONTINUED | OUTPATIENT
Start: 2021-08-11 | End: 2021-08-11 | Stop reason: HOSPADM

## 2021-08-11 RX ADMIN — HYDROMORPHONE HYDROCHLORIDE 0.5 MG: 1 INJECTION, SOLUTION INTRAMUSCULAR; INTRAVENOUS; SUBCUTANEOUS at 11:22

## 2021-08-11 RX ADMIN — CEFAZOLIN SODIUM 2 G: 2 INJECTION, SOLUTION INTRAVENOUS at 10:50

## 2021-08-11 RX ADMIN — ROCURONIUM BROMIDE 30 MG: 10 INJECTION INTRAVENOUS at 11:22

## 2021-08-11 RX ADMIN — GLYCOPYRROLATE 0.8 MG: 0.2 INJECTION, SOLUTION INTRAMUSCULAR; INTRAVENOUS at 12:04

## 2021-08-11 RX ADMIN — LIDOCAINE HYDROCHLORIDE 100 MG: 20 INJECTION, SOLUTION INFILTRATION; PERINEURAL at 10:50

## 2021-08-11 RX ADMIN — PROPOFOL 100 MG: 10 INJECTION, EMULSION INTRAVENOUS at 10:53

## 2021-08-11 RX ADMIN — HYDROMORPHONE HYDROCHLORIDE 0.2 MG: 0.2 INJECTION, SOLUTION INTRAMUSCULAR; INTRAVENOUS; SUBCUTANEOUS at 12:24

## 2021-08-11 RX ADMIN — FENTANYL CITRATE 50 MCG: 50 INJECTION, SOLUTION INTRAMUSCULAR; INTRAVENOUS at 10:50

## 2021-08-11 RX ADMIN — FENTANYL CITRATE 50 MCG: 50 INJECTION, SOLUTION INTRAMUSCULAR; INTRAVENOUS at 10:53

## 2021-08-11 RX ADMIN — PHENYLEPHRINE HYDROCHLORIDE 100 MCG: 10 INJECTION INTRAVENOUS at 11:52

## 2021-08-11 RX ADMIN — OXYCODONE HYDROCHLORIDE AND ACETAMINOPHEN 1 TABLET: 5; 325 TABLET ORAL at 13:24

## 2021-08-11 RX ADMIN — NEOSTIGMINE METHYLSULFATE 5 MG: 1 INJECTION, SOLUTION INTRAVENOUS at 12:04

## 2021-08-11 RX ADMIN — METHOCARBAMOL 750 MG: 750 TABLET ORAL at 13:24

## 2021-08-11 RX ADMIN — DEXMEDETOMIDINE 12 MCG: 100 INJECTION, SOLUTION, CONCENTRATE INTRAVENOUS at 11:22

## 2021-08-11 RX ADMIN — SODIUM CHLORIDE, POTASSIUM CHLORIDE, SODIUM LACTATE AND CALCIUM CHLORIDE: 600; 310; 30; 20 INJECTION, SOLUTION INTRAVENOUS at 10:45

## 2021-08-11 RX ADMIN — HYDROMORPHONE HYDROCHLORIDE 0.2 MG: 0.2 INJECTION, SOLUTION INTRAMUSCULAR; INTRAVENOUS; SUBCUTANEOUS at 13:04

## 2021-08-11 RX ADMIN — HYDROMORPHONE HYDROCHLORIDE 0.2 MG: 0.2 INJECTION, SOLUTION INTRAMUSCULAR; INTRAVENOUS; SUBCUTANEOUS at 12:34

## 2021-08-11 RX ADMIN — PROPOFOL 50 MG: 10 INJECTION, EMULSION INTRAVENOUS at 11:22

## 2021-08-11 RX ADMIN — ROCURONIUM BROMIDE 50 MG: 10 INJECTION INTRAVENOUS at 10:51

## 2021-08-11 RX ADMIN — ONDANSETRON 4 MG: 2 INJECTION INTRAMUSCULAR; INTRAVENOUS at 11:57

## 2021-08-11 RX ADMIN — HYDROMORPHONE HYDROCHLORIDE 0.2 MG: 0.2 INJECTION, SOLUTION INTRAMUSCULAR; INTRAVENOUS; SUBCUTANEOUS at 12:51

## 2021-08-11 RX ADMIN — PROPOFOL 200 MG: 10 INJECTION, EMULSION INTRAVENOUS at 10:50

## 2021-08-11 RX ADMIN — MIDAZOLAM 2 MG: 1 INJECTION INTRAMUSCULAR; INTRAVENOUS at 10:45

## 2021-08-11 RX ADMIN — DEXAMETHASONE SODIUM PHOSPHATE 4 MG: 4 INJECTION, SOLUTION INTRA-ARTICULAR; INTRALESIONAL; INTRAMUSCULAR; INTRAVENOUS; SOFT TISSUE at 11:05

## 2021-08-11 RX ADMIN — PHENYLEPHRINE HYDROCHLORIDE 100 MCG: 10 INJECTION INTRAVENOUS at 11:36

## 2021-08-11 ASSESSMENT — MIFFLIN-ST. JEOR: SCORE: 1674.07

## 2021-08-11 ASSESSMENT — LIFESTYLE VARIABLES: TOBACCO_USE: 1

## 2021-08-11 NOTE — ANESTHESIA PROCEDURE NOTES
Airway       Patient location during procedure: OR       Procedure Start/Stop Times: 8/11/2021 10:53 AM  Staff -        Anesthesiologist:  Arturo Patel MD       CRNA: Shameka Stearns APRN CRNA       Performed By: CRNA  Consent for Airway        Urgency: elective  Indications and Patient Condition       Indications for airway management: pati-procedural       Induction type:inhalational       Mask difficulty assessment: 2 - vent by mask + OA or adjuvant +/- NMBA    Final Airway Details       Final airway type: endotracheal airway       Successful airway: ETT - single and Oral  Endotracheal Airway Details        ETT size (mm): 7.5       Successful intubation technique: direct laryngoscopy       DL Blade Type: [place] 2       Grade View of Cords: 1       Position: Right       Measured from: gums/teeth       Secured at (cm): 22       Bite block used: Soft    Post intubation assessment        Placement verified by: capnometry, equal breath sounds and chest rise        Number of attempts at approach: 1       Number of other approaches attempted: 0       Secured with: pink tape       Ease of procedure: easy       Dentition: Intact and Lips/oral mucosa injury (small lip laceration to upper middle lip. lubrication applied)

## 2021-08-11 NOTE — OP NOTE
Name of procedure: Right L5-S1 hemilaminectomy and microdiscectomy; use the operating microscope    Preoperative diagnosis: Focal disc protrusion at right L5-S1 with right S1 radiculopathy refractory to prolonged conservative management.    Postoperative diagnosis: Same    Surgeon: Antony Landeros MD    First assistant: None    Material forwarded to the laboratory for examination: None    EBL: 5 cc    Description of the procedure: Patient was brought the operating room where he was placed under suitable general endotracheal anesthesia and subsequently positioned in the prone position on lumbar rolls.  His lower back was sterilely prepped and draped in usual fashion and a right paramedian incision 1.8 cm in length was made at the L5-S1 level.  This was noted to be a lumbarized sacrum and in fact this was the second fully formed disc from the sacrum.  The level is correlated with sagittal MRI studies to ensure surgery at the proper level.    1.8 x 5 cm Metrix retractor tube was placed at the right L5 hemilamina.  Tube was secured to the table using the articulated mechanical arm and final placement was verified with lateral fluoroscopy.  The operating microscope was then brought into use and used throughout the remainder of the procedure for visualization, illumination and microsurgical technique.  Midas Jas drill was used to perform a partial right L5 hemilaminectomy and minimal medial facetectomy.  Ligamentum flavum was fenestrated and removed in the lateral aspect of the thecal sac and the shoulder of the descending right S1 nerve root was dissected free without difficulty.  A focal disc protrusion was found immediately ventral to the right S1 nerve root with significant mass-effect on the overlying neural elements.  Nerve root was gently retracted medially and the residual annulus was opened sharply with a #15 blade.  Subligamentous disc herniation was removed and moderate large pieces of clearly degenerated  disc material.  The space was then entered and exonerated to the extent possible of additional fragments medially and laterally of degenerated disc material.  At the conclusion of the procedure there was no evidence for residual neurologic impingement and no additional nucleus pulposus could be removed from within the disc space or the epidural space.  Wound is then copiously irrigated with Ancef containing irrigation and meticulous hemostasis was achieved with bipolar cautery.  Metrix retractor tube was withdrawn and the wound was closed in layers using interrupted inverted 3-0 Vicryl suture with a running undyed 4-0 Vicryl in a subcuticular stitch for skin.  Sterile dressing was then applied and patient was subsequently returned to the supine position where he was awakened extubated and transported to recovery room in stable condition.

## 2021-08-11 NOTE — DISCHARGE INSTRUCTIONS
Same Day Surgery Discharge Instructions for  Sedation and General Anesthesia       It's not unusual to feel dizzy, light-headed or faint for up to 24 hours after surgery or while taking pain medication.  If you have these symptoms: sit for a few minutes before standing and have someone assist you when you get up to walk or use the bathroom.      You should rest and relax for the next 24 hours. We recommend you make arrangements to have an adult stay with you for at least 24 hours after your discharge.  Avoid hazardous and strenuous activity.      DO NOT DRIVE any vehicle or operate mechanical equipment for 24 hours following the end of your surgery.  Even though you may feel normal, your reactions may be affected by the medication you have received.      Do not drink alcoholic beverages for 24 hours following surgery.       Slowly progress to your regular diet as you feel able. It's not unusual to feel nauseated and/or vomit after receiving anesthesia.  If you develop these symptoms, drink clear liquids (apple juice, ginger ale, broth, 7-up, etc. ) until you feel better.  If your nausea and vomiting persists for 24 hours, please notify your surgeon.        All narcotic pain medications, along with inactivity and anesthesia, can cause constipation. Drinking plenty of liquids and increasing fiber intake will help.      For any questions of a medical nature, call your surgeon.      Do not make important decisions for 24 hours.      If you had general anesthesia, you may have a sore throat for a couple of days related to the breathing tube used during surgery.  You may use Cepacol lozenges to help with this discomfort.  If it worsens or if you develop a fever, contact your surgeon.       If you feel your pain is not well managed with the pain medications prescribed by your surgeon, please contact your surgeon's office to let them know so they can address your concerns.       CoVid 19 Information    We want to give you  information regarding Covid. Please consult your primary care provider with any questions you might have.     Patient who have symptoms (cough, fever, or shortness of breath), need to isolate for 7 days from when symptoms started OR 72 hours after fever resolves (without fever reducing medications) AND improvement of respiratory symptoms (whichever is longer).      Isolate yourself at home (in own room/own bathroom if possible)    Do Not allow any visitors    Do Not go to work or school    Do Not go to [employer],  centers, shopping, or other public places.    Do Not shake hands.    Avoid close and intimate contact with others (hugging, kissing).    Follow CDC recommendations for household cleaning of frequently touched services.     After the initial 7 days, continue to isolate yourself from household members as much as possible. To continue decrease the risk of community spread and exposure, you and any members of your household should limit activities in public for 14 days after starting home isolation.     You can reference the following CDC link for helpful home isolation/care tips:  https://www.cdc.gov/coronavirus/2019-ncov/downloads/10Things.pdf    Protect Others:    Cover Your Mouth and Nose with a mask, disposable tissue or wash cloth to avoid spreading germs to others.    Wash your hands and face frequently with soap and water    Call Your Primary Doctor If: Breathing difficulty develops or you become worse.    For more information about COVID19 and options for caring for yourself at home, please visit the CDC website at https://www.cdc.gov/coronavirus/2019-ncov/about/steps-when-sick.html  For more options for care at [facility], please visit our website at [email or web address]        Spine and Brain Clinic at [facility]  Dr. Landeros Discharge Instructions Following Spine Surgery  [phone]  Monday - Friday; 8:00 AM - 4:00 PM    In General:    After you have had surgery on your spine, remember do not twist, or excessively flex or extend the area that you had surgery.  These activities can prevent healing.  Pain is normal and to be expected following surgery.  Please call our office to schedule your appointment follow up appointment.      Bowel Care:  Many people have constipation (hard stools) after surgery.  To help prevent constipation: Drink plenty of fluid (8-10 glasses/day); Eat more fiber, such as whole grain bread, bran cereal, and fruits and vegetables; Stay active by walking; Over the counter stool softener may also help.      Medications:  Spine surgery and pain management is unique to all patients.  You will generally be given medications for pain, muscle spasms or tightness, and for constipation during the immediate post op period.  It is important that you use these as prescribed.  Please remember to bring your pill bottles to all of your appointments. Avoid driving while taking narcotic pain medications.  Avoid alcoholic beverages while taking narcotic pain medications. You can use ice to areas of pain as needed, 20 minutes at a time.  Changing positions and walking will help loosen your muscles as well.  No NSAIDs (Ibuprofen, Advil, Motrin, Aleve, Naproxen) for 14 days.    Driving:  No driving while on narcotic pain medications.  It is state law not to drive while under the influence of a drug to a degree which renders you incapable of safely driving.  The narcotic medication you will be taking after surgery falls under this category.     Activity:   After surgery, most people feel less pain than they have had in a long time.  Walking and light activities will help you regain the use of your muscles.  You are encouraged to walk: start with short walks 5-10 minutes at a time for 4-5 times per day and increase as tolerated.  Stair climbing as tolerated, we recommend you use the railing. No lifting greater than 10 pounds: approximately equal  to one gallon of milk. No twisting, bending in the area you have had surgery. No housework, vacuuming, laundry, leaf raking, lawn mowing, or snow removal. Wear your brace (if ordered) as directed.    Showers:  If you have sutures or staples you may shower two days after surgery. It is ok to let water run over your incision but do not touch or scrub on the incision. Pat dry immediately after showering. If there is a dressing in place, you may remove it 2 days after surgery. If you were closed with Derma cabrales (glue), you may shower without covering the incision. No baths, hot tubs, or pool activity for at least 6 weeks.     Nutrition:  In general, your diet restrictions will not change with your surgery.  You may need to eat small frequent meals initially until your appetite returns.  Eat plenty of high fiber foods and drink plenty of fluids. If you do not have a fluid restriction from or prior to surgery, we recommend 6-8 (8oz) glasses of water per day. Other fluids are fine, but water is best. Nausea is not uncommon; it is a common side effect to many pain medications.  We recommend that you take the pain medications with food, if this does not improve your symptoms, please call us.     Follow-Up Appointment  Neurosurgery Appointment with in 2 weeks and the MARIBEL at the clinic in 6 weeks.  Call [phone] for appointment.  Call Dr. Landeros at [phone] if these occur: Drainage from your incision, increased pain/redness/swelling, temperatures greater than 101.5, increased leg pain or swelling or unrelieved headaches    Go to the nearest Emergency Room if you experience: chest pain, shortness of breath, neck swelling or swallowing problems

## 2021-08-11 NOTE — ANESTHESIA PREPROCEDURE EVALUATION
Anesthesia Pre-Procedure Evaluation    Patient: Kolton Kang   MRN: [id number] : 1965        Preoperative Diagnosis: Acute right-sided low back pain with right-sided sciatica [M54.41]   Procedure : Procedure(s):  Right lumbar 5-sacral 1 hemilaminectomy and microdiscectomy; *lumbarized sacrum*     History reviewed. No pertinent past medical history.   Past Surgical History:   Procedure Laterality Date     COLONOSCOPY WITH CO2 INSUFFLATION N/A 2016    Procedure: COLONOSCOPY WITH CO2 INSUFFLATION;  Surgeon: Mya Olea MD;  Location: [facility] OR     EXTRACTION(S) DENTAL        No Known Allergies   Social History     Tobacco Use     Smoking status: Current Every Day Smoker     Packs/day: 1.50     Years: 20.00     Pack years: 30.00     Types: Cigarettes     Smokeless tobacco: Never Used     Tobacco comment: Thinking about quitting in the next year   Substance Use Topics     Alcohol use: Yes     Comment: 10+ per week      Wt Readings from Last 1 Encounters:   21 83.9 kg (185 lb)        Anesthesia Evaluation   Pt has had prior anesthetic. Type: MAC.    No history of anesthetic complications       ROS/MED HX  ENT/Pulmonary:     (+) tobacco use, Current use,  (-) sleep apnea   Neurologic: Comment: R leg weakness and numbness      Cardiovascular:  - neg cardiovascular ROS  (-) hypertension   METS/Exercise Tolerance:     Hematologic:       Musculoskeletal: Comment: Back pain      GI/Hepatic:     (+) GERD, Asymptomatic on medication,     Renal/Genitourinary:  - neg Renal ROS     Endo:    (-) Type II DM   Psychiatric/Substance Use:       Infectious Disease:       Malignancy:       Other:            Physical Exam    Airway        Mallampati: II   TM distance: > 3 FB   Neck ROM: full   Mouth opening: > 3 cm    Respiratory Devices and Support         Dental  no notable dental history         Cardiovascular   cardiovascular exam normal          Pulmonary   pulmonary exam normal                OUTSIDE  LABS:  CBC:   Lab Results   Component Value Date    WBC 6.0 07/22/2021    WBC 10.0 12/02/2016    HGB 14.9 07/22/2021    HGB 15.2 12/02/2016    HCT 42.8 07/22/2021    HCT 43.4 12/02/2016     07/22/2021     12/02/2016     BMP:   Lab Results   Component Value Date     07/22/2021     12/02/2016    POTASSIUM 5.0 07/22/2021    POTASSIUM 4.2 12/02/2016    CHLORIDE 108 07/22/2021    CHLORIDE 105 12/02/2016    CO2 28 07/22/2021    CO2 26 12/02/2016    BUN 18 07/22/2021    BUN 14 12/02/2016    CR 0.99 07/22/2021    CR 0.93 12/02/2016    GLC 93 07/22/2021     (H) 12/02/2016     COAGS: No results found for: PTT, INR, FIBR  POC: No results found for: BGM, HCG, HCGS  HEPATIC:   Lab Results   Component Value Date    ALBUMIN 4.0 12/02/2016    PROTTOTAL 7.7 12/02/2016    ALT 43 12/02/2016    AST 43 12/02/2016    ALKPHOS 88 12/02/2016    BILITOTAL 0.4 12/02/2016     OTHER:   Lab Results   Component Value Date    A1C 5.0 07/22/2021    DEIRDRE 9.2 07/22/2021    TSH 2.30 07/22/2021       Anesthesia Plan    ASA Status:  2   NPO Status:  NPO Appropriate    Anesthesia Type: General.     - Airway: ETT   Induction: Intravenous, Propofol.   Maintenance: Balanced.        Consents    Anesthesia Plan(s) and associated risks, benefits, and realistic alternatives discussed. Questions answered and patient/representative(s) expressed understanding.     - Discussed with:  Patient         Postoperative Care    Pain management: Multi-modal analgesia.   PONV prophylaxis: Ondansetron (or other 5HT-3), Dexamethasone or Solumedrol     Comments:                Arturo Patel MD

## 2021-08-11 NOTE — ANESTHESIA POSTPROCEDURE EVALUATION
Patient: Kolton Kang    Procedure(s):  Right lumbar 5-sacral 1 hemilaminectomy and microdiscectomy    Diagnosis:Acute right-sided low back pain with right-sided sciatica [M54.41]  Diagnosis Additional Information: No value filed.    Anesthesia Type:  General    Note:  Disposition: Outpatient   Postop Pain Control: Uneventful            Sign Out: Well controlled pain   PONV: No   Neuro/Psych: Uneventful            Sign Out: Acceptable/Baseline neuro status   Airway/Respiratory: Uneventful            Sign Out: Acceptable/Baseline resp. status   CV/Hemodynamics: Uneventful            Sign Out: Acceptable CV status; No obvious hypovolemia; No obvious fluid overload   Other NRE: NONE   DID A NON-ROUTINE EVENT OCCUR? No           Last vitals:  Vitals Value Taken Time   /95 08/11/21 1330   Temp 36.5  C (97.7  F) 08/11/21 1300   Pulse 73 08/11/21 1335   Resp 6 08/11/21 1335   SpO2 97 % 08/11/21 1335   Vitals shown include unvalidated device data.    Electronically Signed By: Arturo Patel MD  August 11, 2021  1:35 PM

## 2021-08-11 NOTE — ANESTHESIA CARE TRANSFER NOTE
Patient: Kolton Kang    Procedure(s):  Right lumbar 5-sacral 1 hemilaminectomy and microdiscectomy    Diagnosis: Acute right-sided low back pain with right-sided sciatica [M54.41]  Diagnosis Additional Information: No value filed.    Anesthesia Type:   General     Note:    Oropharynx: oropharynx clear of all foreign objects and spontaneously breathing  Level of Consciousness: awake  Oxygen Supplementation: room air    Independent Airway: airway patency satisfactory and stable  Dentition: dentition unchanged  Vital Signs Stable: post-procedure vital signs reviewed and stable  Report to RN Given: handoff report given  Patient transferred to: PACU    Handoff Report: Identifed the Patient, Identified the Reponsible Provider, Reviewed the pertinent medical history, Discussed the surgical course, Reviewed Intra-OP anesthesia mangement and issues during anesthesia, Set expectations for post-procedure period and Allowed opportunity for questions and acknowledgement of understanding      Vitals:  Vitals Value Taken Time   BP     Temp     Pulse     Resp     SpO2 99 % 08/11/21 1213   Vitals shown include unvalidated device data.    Electronically Signed By: JUNIOR Stewart CRNA  August 11, 2021  12:14 PM

## 2021-08-12 DIAGNOSIS — M54.41 ACUTE RIGHT-SIDED LOW BACK PAIN WITH RIGHT-SIDED SCIATICA: ICD-10-CM

## 2021-08-12 RX ORDER — CYCLOBENZAPRINE HCL 10 MG
10 TABLET ORAL 3 TIMES DAILY PRN
Qty: 24 TABLET | Refills: 0 | Status: SHIPPED | OUTPATIENT
Start: 2021-08-12 | End: 2021-08-25

## 2021-08-12 RX ORDER — HYDROCODONE BITARTRATE AND ACETAMINOPHEN 5; 325 MG/1; MG/1
1 TABLET ORAL EVERY 6 HOURS PRN
Qty: 24 TABLET | Refills: 0 | Status: SHIPPED | OUTPATIENT
Start: 2021-08-12 | End: 2021-09-23

## 2021-08-12 NOTE — TELEPHONE ENCOUNTER
Post-Op Call    DOS: 8/11/2021  Surgery: Right lumbar 5-sacral 1 hemilaminectomy and microdiscectomy  Surgeon: Dr. Landeros    Called patient for post-operative follow-up.     Pain description: Pt states he had a rough night due to heartburn associated with the medications he was discharged on. He had difficulty sleeping due to post-op pain. He does dec=scibe a decrease in radicular symptoms that were present prior to surgery.  Pain medication use: Unable to take oxycodone due to side-effects and is requesting to be switched to a new medication.  Discussed weaning from pain medication as pain improves. Provided education about maximum acetaminophen dose in 24 hours from all sources. Reinforced need to hold NSAIDs for 2 weeks post-op.     Patient is walking without difficulty. Encouraged short, frequent walks as tolerated.     Patient has not had a bowel movement since surgery. Discussed reasons of constipation after surgery and reinforced treatment options.     Patient's dressing has not been removed. Patient denies any signs or symptoms of infection. Incision care reinforced.     Reviewed follow up appointments and clinic contact information. Patient will call with any questions or concerns.

## 2021-08-25 DIAGNOSIS — M54.41 ACUTE RIGHT-SIDED LOW BACK PAIN WITH RIGHT-SIDED SCIATICA: ICD-10-CM

## 2021-08-25 RX ORDER — OXYCODONE HYDROCHLORIDE 5 MG/1
5 TABLET ORAL EVERY 6 HOURS PRN
Qty: 20 TABLET | Refills: 0 | Status: SHIPPED | OUTPATIENT
Start: 2021-08-25 | End: 2021-09-23

## 2021-08-25 RX ORDER — CYCLOBENZAPRINE HCL 10 MG
10 TABLET ORAL 3 TIMES DAILY PRN
Qty: 20 TABLET | Refills: 0 | Status: SHIPPED | OUTPATIENT
Start: 2021-08-25 | End: 2021-09-23

## 2021-08-25 RX ORDER — IBUPROFEN 800 MG/1
800 TABLET, FILM COATED ORAL EVERY 8 HOURS PRN
Qty: 40 TABLET | Refills: 0 | Status: SHIPPED | OUTPATIENT
Start: 2021-08-25 | End: 2021-09-23

## 2021-09-23 DIAGNOSIS — M54.41 ACUTE RIGHT-SIDED LOW BACK PAIN WITH RIGHT-SIDED SCIATICA: ICD-10-CM

## 2021-09-23 RX ORDER — CYCLOBENZAPRINE HCL 10 MG
10 TABLET ORAL 3 TIMES DAILY PRN
Qty: 30 TABLET | Refills: 0 | Status: SHIPPED | OUTPATIENT
Start: 2021-09-23

## 2021-09-23 RX ORDER — HYDROCODONE BITARTRATE AND ACETAMINOPHEN 5; 325 MG/1; MG/1
1 TABLET ORAL EVERY 6 HOURS PRN
Qty: 24 TABLET | Refills: 0 | Status: SHIPPED | OUTPATIENT
Start: 2021-09-23

## 2021-09-23 RX ORDER — IBUPROFEN 800 MG/1
800 TABLET, FILM COATED ORAL EVERY 8 HOURS PRN
Qty: 40 TABLET | Refills: 0 | Status: SHIPPED | OUTPATIENT
Start: 2021-09-23

## 2021-09-23 NOTE — TELEPHONE ENCOUNTER
Reason for call: Pt called to request refill of his medications. Please call him back at [phone]. Thank you

## 2021-09-23 NOTE — TELEPHONE ENCOUNTER
Patient calling for a refill of Norco 5-325, flexeril, and ibuprofen.     DOS: 8/11/2021  Procedure: L5-S1 HLMD  Surgeon: Leti     Current symptom(s): right buttock pain, denies N/T or weakness, denies b/b changes. Pt over exerted himself and has had a flare up of pain - otherwise reports he is doing well.     Current pain management: ibuprofen 800 mg, Norco 5-325, flexeril 10 mg - taking all PRN    Next visit: 09/24/2021    Medication pended for your approval, if appropriate. Pharmacy verified.     Informed patient request will be forwarded to care team.     Per : oxycodone last filled 08/25/2021, 20#, 5 days supply. Last Norco filled 8/13/21, 24#, 6 day supply. We are the only prescribing providers.

## 2021-09-24 ENCOUNTER — OFFICE VISIT (OUTPATIENT)
Dept: NEUROSURGERY | Facility: CLINIC | Age: 56
End: 2021-09-24
Payer: COMMERCIAL

## 2021-09-24 VITALS — RESPIRATION RATE: 16 BRPM | SYSTOLIC BLOOD PRESSURE: 132 MMHG | DIASTOLIC BLOOD PRESSURE: 86 MMHG | HEART RATE: 103 BPM

## 2021-09-24 DIAGNOSIS — Z98.890 S/P LUMBAR MICRODISCECTOMY: Primary | ICD-10-CM

## 2021-09-24 PROCEDURE — 99024 POSTOP FOLLOW-UP VISIT: CPT | Performed by: PHYSICIAN ASSISTANT

## 2021-09-24 NOTE — LETTER
9/24/2021         RE: Kolton Kang  [address] [address] [address]        Dear Colleague,    Thank you for referring your patient, Kolton Kang, to the [facility] [place]. Please see a copy of my visit note below.    Neurosurgery 6-week follow-up    Mr. Kang is 6 weeks status post right L5-S1 hemilaminectomy microdiscectomy.  He states he feels well 90% better.  He has had a few little twinges and tingles of pain but he is very pleased with his surgical outcome.  Denies any issues with his incision.    Exam     Alert and oriented no acute distress  Incision is well-healed  Gait is normal    Assessment    Status post right L5-S1 hemilaminectomy and microdiscectomy      Plan    Gradually increase activity as tolerated.   Follow up as needed.       Again, thank you for allowing me to participate in the care of your patient.        Sincerely,        Sabino Ruiz PA-C

## 2021-09-24 NOTE — PROGRESS NOTES
Neurosurgery 6-week follow-up    Mr. Kang is 6 weeks status post right L5-S1 hemilaminectomy microdiscectomy.  He states he feels well 90% better.  He has had a few little twinges and tingles of pain but he is very pleased with his surgical outcome.  Denies any issues with his incision.    Exam     Alert and oriented no acute distress  Incision is well-healed  Gait is normal    Assessment    Status post right L5-S1 hemilaminectomy and microdiscectomy      Plan    Gradually increase activity as tolerated.   Follow up as needed.

## 2021-10-09 ENCOUNTER — HEALTH MAINTENANCE LETTER (OUTPATIENT)
Age: 56
End: 2021-10-09

## 2022-04-12 ENCOUNTER — MYC MEDICAL ADVICE (OUTPATIENT)
Dept: FAMILY MEDICINE | Facility: CLINIC | Age: 57
End: 2022-04-12
Payer: COMMERCIAL

## 2022-04-13 ENCOUNTER — E-VISIT (OUTPATIENT)
Dept: FAMILY MEDICINE | Facility: CLINIC | Age: 57
End: 2022-04-13
Payer: COMMERCIAL

## 2022-04-13 DIAGNOSIS — J06.9 VIRAL URI: Primary | ICD-10-CM

## 2022-04-13 PROCEDURE — 99207 PR NON-BILLABLE SERV PER CHARTING: CPT | Performed by: FAMILY MEDICINE

## 2022-04-14 NOTE — TELEPHONE ENCOUNTER
LMTC, please see message below and assist with scheduling  Thanks  Lottie Padilla RT (R)       Please assist patient with scheduling virtual visit to address this issue.  May also use urgent care if needed.     Ron Terrazas MD, FAAFP   Family Medicine Physician   [facility] [place]   [address] [place] [address]

## 2022-04-14 NOTE — PATIENT INSTRUCTIONS
You may want to try warm salt water gargles or rinses to feel better or help prevent another bout in the future. Mix 1 teaspoon of salt in 8 ounces of water, gargle, and spit. Do this several times a day for several days. Do not swallow the mixture.    You may want to try a nasal lavage (also known as nasal irrigation). You can find over-the-counter products, such as Neti-Pot, at retail locations or make your own at home. Instructions for homemade nasal lavage and more information on the process are available online at http://www.aafp.org/afp/2009/1115/p1121.html.    Thank you for choosing us for your care. I think an in-clinic visit would be best next steps based on your symptoms. Please schedule a clinic appointment; you won t be charged for this eVisit.      You can schedule an appointment right here in Thinglink, or call [phone]

## 2022-08-31 ENCOUNTER — TELEPHONE (OUTPATIENT)
Dept: FAMILY MEDICINE | Facility: CLINIC | Age: 57
End: 2022-08-31

## 2022-08-31 NOTE — TELEPHONE ENCOUNTER
All your spots for Thursday and Friday are full-are you able to work him in any time those days or do we need to find another site?     Romelia Larson CMA ([facility])

## 2022-08-31 NOTE — TELEPHONE ENCOUNTER
Pt is calling about getting a Cortizone shot done in the next few days. He stated that Dr Terrazas has done these before and would like to get on the schedule. Tomorrow mid morning mid afternoon would work best. He is wanting it before he goes out of town this weekend.     Can call pt at [phone].

## 2022-08-31 NOTE — TELEPHONE ENCOUNTER
Please schedule at next available appointment.    Ron Terrazas MD, FAAFP  Family Medicine Physician  [facility] [place]  [address] [address] [place] [address]

## 2022-09-01 ENCOUNTER — TELEPHONE (OUTPATIENT)
Dept: FAMILY MEDICINE | Facility: CLINIC | Age: 57
End: 2022-09-01

## 2022-09-01 NOTE — TELEPHONE ENCOUNTER
See other encounter    I spoke with patient and informed him that Dr Terrazas had to leave clinic today on emergency, I did inform patient that Dr Terrazas's schedule for Friday is full, patient is requesting to be worked in if possible.  He states that he stopped at Urgent care but they do not do injections there.    Patient understands and would appreciate a call back once decision is made if able to be worked in  Thanks  Lottie Padilla RT (R)

## 2022-09-01 NOTE — TELEPHONE ENCOUNTER
FYI - Status Update    Who is Calling: patient    Update: patient was on hold and rolled over to me. Please see note below     Does caller want a call/response back: Yes     Could we send this information to you in Cloud[employer] or would you prefer to receive a phone call?:   Patient would prefer a phone call   Okay to leave a detailed message?: Yes at Cell number on file:    Telephone Information:   Mobile [phone]

## 2022-09-01 NOTE — TELEPHONE ENCOUNTER
Pt stopped by and would appreciate a cortizone shot in shoulder Friday morning.  Please call Kolton @ [phone]

## 2022-09-01 NOTE — TELEPHONE ENCOUNTER
I believe patient is talking about epidural steroid injection.  I do not do those.  He should follow-up with the neurosurgeon who did his previous injection.    Ron Terrazas MD, FAAFP  Family Medicine Physician  [facility] [place]  [address] [address] [address]

## 2022-09-01 NOTE — TELEPHONE ENCOUNTER
I spoke with patient and informed him that Dr Terrazas had to leave clinic today on emergency, I did inform patient that Dr Terrazas's schedule for Friday is full, patient is requesting to be worked in if possible.  He states that he stopped at Urgent care but they do not do injections there.    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     Thanks  Lottie Padilla RT (R)

## 2022-09-01 NOTE — TELEPHONE ENCOUNTER
Spoke with patient he states that he is wanting a cortizone injection in his left shoulder you have done this for him before.  He states that he is in so much pain that he can't even hold his phone up, he needs to get done ASAP as he has a new job starting in [facility].  He is hoping you can work him in, He can come at anytime besides 11-12:30 today as he has a  otherwise he can be here in 5 minutes.    Please advise if you are able to work him in   Thanks  Lottie Padilla RT (R)

## 2022-09-02 ENCOUNTER — OFFICE VISIT (OUTPATIENT)
Dept: FAMILY MEDICINE | Facility: CLINIC | Age: 57
End: 2022-09-02
Payer: COMMERCIAL

## 2022-09-02 VITALS
WEIGHT: 180 LBS | OXYGEN SATURATION: 98 % | TEMPERATURE: 98.7 F | HEART RATE: 97 BPM | SYSTOLIC BLOOD PRESSURE: 130 MMHG | BODY MASS INDEX: 26.21 KG/M2 | DIASTOLIC BLOOD PRESSURE: 82 MMHG

## 2022-09-02 DIAGNOSIS — M67.912 TENDINOPATHY OF LEFT ROTATOR CUFF: ICD-10-CM

## 2022-09-02 DIAGNOSIS — M25.512 CHRONIC LEFT SHOULDER PAIN: Primary | ICD-10-CM

## 2022-09-02 DIAGNOSIS — G89.29 CHRONIC LEFT SHOULDER PAIN: Primary | ICD-10-CM

## 2022-09-02 PROCEDURE — 99213 OFFICE O/P EST LOW 20 MIN: CPT | Mod: 25 | Performed by: FAMILY MEDICINE

## 2022-09-02 PROCEDURE — 20610 DRAIN/INJ JOINT/BURSA W/O US: CPT | Performed by: FAMILY MEDICINE

## 2022-09-02 RX ORDER — TRIAMCINOLONE ACETONIDE 40 MG/ML
40 INJECTION, SUSPENSION INTRA-ARTICULAR; INTRAMUSCULAR ONCE
Status: COMPLETED | OUTPATIENT
Start: 2022-09-02 | End: 2022-09-02

## 2022-09-02 RX ADMIN — TRIAMCINOLONE ACETONIDE 40 MG: 40 INJECTION, SUSPENSION INTRA-ARTICULAR; INTRAMUSCULAR at 15:08

## 2022-09-02 ASSESSMENT — PAIN SCALES - GENERAL: PAINLEVEL: SEVERE PAIN (6)

## 2022-09-02 NOTE — TELEPHONE ENCOUNTER
Please walk patient in for shoulder injection only today at 2:30.    Ron Terrazas MD, FAAFP  Family Medicine Physician  [facility] [place]  [address] [address] [address]

## 2022-09-02 NOTE — PROGRESS NOTES
Assessment & Plan     Chronic left shoulder pain  Patient seen and examined with nurse practitioner student, Mar and Juan.  Chronic left shoulder pain, recent worsening of pain.  Examination consistent with subacromial bursitis versus rotator cuff tendinitis.  He had same pain of right shoulder in the past with good improvement per patient following corticosteroid injection.  We conducted the same today with improvement in pain.  Procedure was closely supervised by me, conducted by nurse practitioner student Mar.  No complications.  See procedure note below.  - XR Shoulder Left G/E 3 Views; Future  - triamcinolone (KENALOG-40) injection 40 mg  - lidocaine 1 % 5 mL    Tendinopathy of left rotator cuff  See above.  X-ray unremarkable.  - triamcinolone (KENALOG-40) injection 40 mg  - lidocaine 1 % 5 mL  - DRAIN/INJECT LARGE JOINT/BURSA         Nicotine/Tobacco Cessation:  He reports that he has been smoking cigarettes. He has a 30.00 pack-year smoking history. He has never used smokeless tobacco.  Nicotine/Tobacco Cessation Plan:           No follow-ups on file.    Ron Terrazas MD  [facility] Hi is a 56 year old, presenting for the following health issues:  Shoulder Pain      Shoulder Pain    History of Present Illness       Reason for visit:  Tendon left shoulder  Symptom onset:  1-2 weeks ago  Symptoms include:  Pain in my tendon it rolled and then I rolled it back the inflammation will not go away  Symptom intensity:  Severe  Symptom progression:  Staying the same  Had these symptoms before:  Yes  Has tried/received treatment for these symptoms:  Yes  Previous treatment was successful:  Yes  Prior treatment description:  Cortisone shot  What makes it worse:  Movement  What makes it better:  No    He eats 0-1 servings of fruits and vegetables daily.He consumes 0 sweetened beverage(s) daily.He exercises with enough effort to increase his heart rate 60 or more minutes  per day.  He exercises with enough effort to increase his heart rate 6 days per week.   He is taking medications regularly.     History of multiple steroid injections, last one 3 years ago January    Left shoulder 9 days ago. Reports arm locked up. Movement back.     Cant lift straight up in front of him.     Reports basic XR.     Ice and heat. Lidoderm patches. Aleve.     Denies numbness and tingling in hand.     No pain on biceps tendon to palpation.     Pain significantly on deltoid.     Review of Systems   Constitutional, HEENT, cardiovascular, pulmonary, gi and gu systems are negative, except as otherwise noted.      Objective    /82   Pulse 97   Temp 98.7  F (37.1  C) (Temporal)   Wt 81.6 kg (180 lb)   SpO2 98%   BMI 26.21 kg/m    Body mass index is 26.21 kg/m .  Physical Exam   GENERAL: healthy, alert and no distress  MS: normal muscle tone, no cyanosis, clubbing, or edema, no varicosities, no edema, peripheral pulses normal and left shoulder- shoulder appears symmetrical to right, decreased range of motion secondary to pain with shoulder flexion, sensory intact.  Positive Hawking, positive Neer.  Remainder of exam was somewhat limited secondary to pain, however negative empty can, negative Conway, biceps tendon nontender and intact with good strength.  NEURO: Normal strength and tone, mentation intact and speech normal  PSYCH: mentation appears normal, affect normal/bright    Xray - Reviewed and interpreted by me.  No significant degenerative changes, no fractures    Procedure note- corticosteroid injection of left shoulder using subacromial posterior approach  Discussed risks with procedure, obtained written consent.  Landmarks identified, marked, cleansed with Betadine.  Injected in normal fashion with 1 cc Kenalog 40, 5 cc lidocaine 1% without epinephrine.  Injected easily without resistance.  Range of motion exercises conducted, good reduction in pain symptoms.  Band-Aid placed.  Patient  discharged in stable condition with follow-up instructions.

## 2022-09-02 NOTE — TELEPHONE ENCOUNTER
LMTC, please advise patient that we do not see that Dr Terrazas has done his shoulder injection in the past, according to the charts it looks like the injection was done outside of [facility] it was done by:  Dr Lamb with [facility].  In order for Dr Terrazas to do the injection we will need to do imaging, and assessment.  Please inform patient he may also have a wait as Dr Terrazas was working him in.   Thanks  Lottie Padilla RT (R)

## 2022-09-02 NOTE — TELEPHONE ENCOUNTER
Spoke with patient appointment scheduled   Next 5 appointments (look out 90 days)    Sep 02, 2022  2:30 PM  (Arrive by 2:10 PM)  Provider Visit with Ron Terrazas MD  [facility] [place] ([facility] [place] ) [address], [address]  [facility] [address]  [phone]        Closing encounter  Lottie Padilla RT (R)

## 2022-09-02 NOTE — TELEPHONE ENCOUNTER
"Spoke with patient, message given.  Patient was on his way to clinic at the time.  Patient stated that he does not want to pay for any imaging and will discuss with Dr Terrazas when he arrives as he \"knows what happened\".    Nessa Welsh XRO/  "

## 2022-09-11 ENCOUNTER — HEALTH MAINTENANCE LETTER (OUTPATIENT)
Age: 57
End: 2022-09-11

## 2023-10-07 ENCOUNTER — HEALTH MAINTENANCE LETTER (OUTPATIENT)
Age: 58
End: 2023-10-07

## 2024-07-21 NOTE — OR NURSING
Per order, both nares were swabbed with  Nasal Antiseptic Povidone-Iodine 5% Solution.       unknown

## 2024-11-30 ENCOUNTER — HEALTH MAINTENANCE LETTER (OUTPATIENT)
Age: 59
End: 2024-11-30

## (undated) DEVICE — GLOVE PROTEXIS BLUE W/NEU-THERA 8.0  2D73EB80

## (undated) DEVICE — GOWN XXL 9575

## (undated) DEVICE — GLOVE PROTEXIS W/NEU-THERA 8.5  2D73TE85

## (undated) DEVICE — DRAPE SHEET REV FOLD 3/4 9349

## (undated) DEVICE — SU VICRYL 4-0 PS-2 18" UND J496H

## (undated) DEVICE — PACK SPINE SM CUSTOM SNE15SSFSK

## (undated) DEVICE — TOOL DISSECT MIDAS MR8 12CM TELESC MATCH 2.5 MR8-T12MH25

## (undated) DEVICE — ESU GROUND PAD UNIVERSAL W/O CORD

## (undated) DEVICE — POSITIONER PT PRONESAFE HEAD REST W/DERMAPROX INSERT 40599

## (undated) DEVICE — BLADE KNIFE SURG 15 371115

## (undated) DEVICE — DRAPE IOBAN INCISE 23X17" 6650EZ

## (undated) DEVICE — SU VICRYL 3-0 RB-1 18" J713D

## (undated) DEVICE — CUP AND LID 2PK 2OZ STERILE  SSK9006A

## (undated) DEVICE — ESU ELEC BLADE 6" COATED/INSULATED E1455-6

## (undated) DEVICE — MANIFOLD NEPTUNE 4 PORT 700-20

## (undated) DEVICE — DRAPE MICROSCOPE LEICA 54X150" AR8033650

## (undated) DEVICE — DRSG GAUZE 4X4" 2187

## (undated) DEVICE — DRAPE COVER C-ARM SEAMLESS SNAP-KAP 03-KP26 LATEX FREE

## (undated) DEVICE — SPONGE SURGIFOAM 12 1972

## (undated) DEVICE — ESU PENCIL W/HOLSTER E2350H

## (undated) DEVICE — SOL WATER IRRIG 1000ML BOTTLE 2F7114

## (undated) DEVICE — LINEN TOWEL PACK X5 5464

## (undated) DEVICE — GLOVE PROTEXIS BLUE W/NEU-THERA 8.5  2D73EB85

## (undated) RX ORDER — DEXAMETHASONE SODIUM PHOSPHATE 4 MG/ML
INJECTION, SOLUTION INTRA-ARTICULAR; INTRALESIONAL; INTRAMUSCULAR; INTRAVENOUS; SOFT TISSUE
Status: DISPENSED
Start: 2021-08-11

## (undated) RX ORDER — PROPOFOL 10 MG/ML
INJECTION, EMULSION INTRAVENOUS
Status: DISPENSED
Start: 2021-08-11

## (undated) RX ORDER — LIDOCAINE HYDROCHLORIDE 20 MG/ML
INJECTION, SOLUTION EPIDURAL; INFILTRATION; INTRACAUDAL; PERINEURAL
Status: DISPENSED
Start: 2021-08-11

## (undated) RX ORDER — METHOCARBAMOL 750 MG/1
TABLET, FILM COATED ORAL
Status: DISPENSED
Start: 2021-08-11

## (undated) RX ORDER — LIDOCAINE HYDROCHLORIDE 10 MG/ML
INJECTION, SOLUTION EPIDURAL; INFILTRATION; INTRACAUDAL; PERINEURAL
Status: DISPENSED
Start: 2021-07-13

## (undated) RX ORDER — HYDROMORPHONE HCL IN WATER/PF 6 MG/30 ML
PATIENT CONTROLLED ANALGESIA SYRINGE INTRAVENOUS
Status: DISPENSED
Start: 2021-08-11

## (undated) RX ORDER — CEFAZOLIN SODIUM 2 G/100ML
INJECTION, SOLUTION INTRAVENOUS
Status: DISPENSED
Start: 2021-08-11

## (undated) RX ORDER — FENTANYL CITRATE 50 UG/ML
INJECTION, SOLUTION INTRAMUSCULAR; INTRAVENOUS
Status: DISPENSED
Start: 2021-08-11

## (undated) RX ORDER — DEXAMETHASONE SODIUM PHOSPHATE 10 MG/ML
INJECTION, SOLUTION INTRAMUSCULAR; INTRAVENOUS
Status: DISPENSED
Start: 2021-07-13

## (undated) RX ORDER — OXYCODONE AND ACETAMINOPHEN 5; 325 MG/1; MG/1
TABLET ORAL
Status: DISPENSED
Start: 2021-08-11

## (undated) RX ORDER — HYDROMORPHONE HYDROCHLORIDE 1 MG/ML
INJECTION, SOLUTION INTRAMUSCULAR; INTRAVENOUS; SUBCUTANEOUS
Status: DISPENSED
Start: 2021-08-11